# Patient Record
Sex: FEMALE | Race: WHITE | Employment: UNEMPLOYED | URBAN - METROPOLITAN AREA
[De-identification: names, ages, dates, MRNs, and addresses within clinical notes are randomized per-mention and may not be internally consistent; named-entity substitution may affect disease eponyms.]

---

## 2018-04-26 ENCOUNTER — HOSPITAL ENCOUNTER (EMERGENCY)
Age: 83
Discharge: HOME | End: 2018-04-26
Attending: EMERGENCY MEDICINE
Payer: MEDICARE

## 2018-04-26 ENCOUNTER — APPOINTMENT (OUTPATIENT)
Dept: CT IMAGING | Age: 83
End: 2018-04-26
Attending: EMERGENCY MEDICINE
Payer: MEDICARE

## 2018-04-26 ENCOUNTER — APPOINTMENT (OUTPATIENT)
Dept: GENERAL RADIOLOGY | Age: 83
End: 2018-04-26
Attending: EMERGENCY MEDICINE
Payer: MEDICARE

## 2018-04-26 VITALS
HEART RATE: 75 BPM | WEIGHT: 220 LBS | HEIGHT: 66 IN | SYSTOLIC BLOOD PRESSURE: 182 MMHG | DIASTOLIC BLOOD PRESSURE: 84 MMHG | TEMPERATURE: 98.1 F | OXYGEN SATURATION: 99 % | RESPIRATION RATE: 13 BRPM | BODY MASS INDEX: 35.36 KG/M2

## 2018-04-26 DIAGNOSIS — N30.01 ACUTE CYSTITIS WITH HEMATURIA: Primary | ICD-10-CM

## 2018-04-26 DIAGNOSIS — R11.2 NAUSEA AND VOMITING IN ADULT: ICD-10-CM

## 2018-04-26 LAB
ALBUMIN SERPL-MCNC: 3 G/DL (ref 3.4–5)
ALBUMIN/GLOB SERPL: 0.8 {RATIO} (ref 0.8–1.7)
ALP SERPL-CCNC: 91 U/L (ref 45–117)
ALT SERPL-CCNC: 13 U/L (ref 13–56)
ANION GAP SERPL CALC-SCNC: 8 MMOL/L (ref 3–18)
APPEARANCE UR: ABNORMAL
APTT PPP: 28.1 SEC (ref 23–36.4)
AST SERPL-CCNC: 12 U/L (ref 15–37)
ATRIAL RATE: 73 BPM
BACTERIA URNS QL MICRO: ABNORMAL /HPF
BASOPHILS # BLD: 0 K/UL (ref 0–0.06)
BASOPHILS NFR BLD: 0 % (ref 0–2)
BILIRUB DIRECT SERPL-MCNC: <0.1 MG/DL (ref 0–0.2)
BILIRUB SERPL-MCNC: 0.3 MG/DL (ref 0.2–1)
BILIRUB UR QL: NEGATIVE
BNP SERPL-MCNC: 700 PG/ML (ref 0–1800)
BUN SERPL-MCNC: 12 MG/DL (ref 7–18)
BUN/CREAT SERPL: 13 (ref 12–20)
CALCIUM SERPL-MCNC: 8.7 MG/DL (ref 8.5–10.1)
CALCULATED P AXIS, ECG09: 48 DEGREES
CALCULATED R AXIS, ECG10: -9 DEGREES
CALCULATED T AXIS, ECG11: 20 DEGREES
CHLORIDE SERPL-SCNC: 103 MMOL/L (ref 100–108)
CO2 SERPL-SCNC: 28 MMOL/L (ref 21–32)
COLOR UR: YELLOW
CREAT SERPL-MCNC: 0.96 MG/DL (ref 0.6–1.3)
DIAGNOSIS, 93000: NORMAL
DIFFERENTIAL METHOD BLD: ABNORMAL
EOSINOPHIL # BLD: 0.1 K/UL (ref 0–0.4)
EOSINOPHIL NFR BLD: 1 % (ref 0–5)
EPITH CASTS URNS QL MICRO: NEGATIVE /LPF (ref 0–5)
ERYTHROCYTE [DISTWIDTH] IN BLOOD BY AUTOMATED COUNT: 14.4 % (ref 11.6–14.5)
GLOBULIN SER CALC-MCNC: 4 G/DL (ref 2–4)
GLUCOSE SERPL-MCNC: 216 MG/DL (ref 74–99)
GLUCOSE UR STRIP.AUTO-MCNC: 100 MG/DL
HCT VFR BLD AUTO: 43.3 % (ref 35–45)
HGB BLD-MCNC: 14.2 G/DL (ref 12–16)
HGB UR QL STRIP: ABNORMAL
INR PPP: 1 (ref 0.8–1.2)
KETONES UR QL STRIP.AUTO: NEGATIVE MG/DL
LEUKOCYTE ESTERASE UR QL STRIP.AUTO: ABNORMAL
LIPASE SERPL-CCNC: 113 U/L (ref 73–393)
LYMPHOCYTES # BLD: 2.2 K/UL (ref 0.9–3.6)
LYMPHOCYTES NFR BLD: 20 % (ref 21–52)
MCH RBC QN AUTO: 29.5 PG (ref 24–34)
MCHC RBC AUTO-ENTMCNC: 32.8 G/DL (ref 31–37)
MCV RBC AUTO: 90 FL (ref 74–97)
MONOCYTES # BLD: 0.6 K/UL (ref 0.05–1.2)
MONOCYTES NFR BLD: 5 % (ref 3–10)
NEUTS SEG # BLD: 8.1 K/UL (ref 1.8–8)
NEUTS SEG NFR BLD: 74 % (ref 40–73)
NITRITE UR QL STRIP.AUTO: NEGATIVE
P-R INTERVAL, ECG05: 190 MS
PH UR STRIP: 7 [PH] (ref 5–8)
PHOSPHATE SERPL-MCNC: 2.4 MG/DL (ref 2.5–4.9)
PLATELET # BLD AUTO: 196 K/UL (ref 135–420)
PMV BLD AUTO: 13.1 FL (ref 9.2–11.8)
POTASSIUM SERPL-SCNC: 3.4 MMOL/L (ref 3.5–5.5)
PROT SERPL-MCNC: 7 G/DL (ref 6.4–8.2)
PROT UR STRIP-MCNC: ABNORMAL MG/DL
PROTHROMBIN TIME: 12.6 SEC (ref 11.5–15.2)
Q-T INTERVAL, ECG07: 406 MS
QRS DURATION, ECG06: 98 MS
QTC CALCULATION (BEZET), ECG08: 447 MS
RBC # BLD AUTO: 4.81 M/UL (ref 4.2–5.3)
RBC #/AREA URNS HPF: ABNORMAL /HPF (ref 0–5)
SODIUM SERPL-SCNC: 139 MMOL/L (ref 136–145)
SP GR UR REFRACTOMETRY: 1.02 (ref 1–1.03)
TROPONIN I SERPL-MCNC: <0.02 NG/ML (ref 0–0.04)
UROBILINOGEN UR QL STRIP.AUTO: 0.2 EU/DL (ref 0.2–1)
VENTRICULAR RATE, ECG03: 73 BPM
WBC # BLD AUTO: 11 K/UL (ref 4.6–13.2)
WBC URNS QL MICRO: ABNORMAL /HPF (ref 0–4)

## 2018-04-26 PROCEDURE — 85730 THROMBOPLASTIN TIME PARTIAL: CPT | Performed by: EMERGENCY MEDICINE

## 2018-04-26 PROCEDURE — 84100 ASSAY OF PHOSPHORUS: CPT | Performed by: EMERGENCY MEDICINE

## 2018-04-26 PROCEDURE — 81001 URINALYSIS AUTO W/SCOPE: CPT | Performed by: EMERGENCY MEDICINE

## 2018-04-26 PROCEDURE — 83880 ASSAY OF NATRIURETIC PEPTIDE: CPT | Performed by: EMERGENCY MEDICINE

## 2018-04-26 PROCEDURE — 93005 ELECTROCARDIOGRAM TRACING: CPT

## 2018-04-26 PROCEDURE — 85610 PROTHROMBIN TIME: CPT | Performed by: EMERGENCY MEDICINE

## 2018-04-26 PROCEDURE — 96374 THER/PROPH/DIAG INJ IV PUSH: CPT

## 2018-04-26 PROCEDURE — 74011250636 HC RX REV CODE- 250/636: Performed by: EMERGENCY MEDICINE

## 2018-04-26 PROCEDURE — 85025 COMPLETE CBC W/AUTO DIFF WBC: CPT | Performed by: EMERGENCY MEDICINE

## 2018-04-26 PROCEDURE — 74177 CT ABD & PELVIS W/CONTRAST: CPT

## 2018-04-26 PROCEDURE — 80076 HEPATIC FUNCTION PANEL: CPT | Performed by: EMERGENCY MEDICINE

## 2018-04-26 PROCEDURE — 96375 TX/PRO/DX INJ NEW DRUG ADDON: CPT

## 2018-04-26 PROCEDURE — 84484 ASSAY OF TROPONIN QUANT: CPT | Performed by: EMERGENCY MEDICINE

## 2018-04-26 PROCEDURE — 96361 HYDRATE IV INFUSION ADD-ON: CPT

## 2018-04-26 PROCEDURE — 71045 X-RAY EXAM CHEST 1 VIEW: CPT

## 2018-04-26 PROCEDURE — 99285 EMERGENCY DEPT VISIT HI MDM: CPT

## 2018-04-26 PROCEDURE — 80048 BASIC METABOLIC PNL TOTAL CA: CPT | Performed by: EMERGENCY MEDICINE

## 2018-04-26 PROCEDURE — 83690 ASSAY OF LIPASE: CPT | Performed by: EMERGENCY MEDICINE

## 2018-04-26 PROCEDURE — 74011636320 HC RX REV CODE- 636/320: Performed by: EMERGENCY MEDICINE

## 2018-04-26 RX ORDER — CEFTRIAXONE 1 G/1
1 INJECTION, POWDER, FOR SOLUTION INTRAMUSCULAR; INTRAVENOUS
Status: COMPLETED | OUTPATIENT
Start: 2018-04-26 | End: 2018-04-26

## 2018-04-26 RX ORDER — FOLIC ACID 1 MG/1
1 TABLET ORAL DAILY
COMMUNITY
End: 2019-10-16

## 2018-04-26 RX ORDER — ONDANSETRON 2 MG/ML
4 INJECTION INTRAMUSCULAR; INTRAVENOUS
Status: COMPLETED | OUTPATIENT
Start: 2018-04-26 | End: 2018-04-26

## 2018-04-26 RX ORDER — CEPHALEXIN 500 MG/1
500 CAPSULE ORAL 4 TIMES DAILY
Qty: 28 CAP | Refills: 0 | Status: SHIPPED | OUTPATIENT
Start: 2018-04-26 | End: 2018-05-03

## 2018-04-26 RX ORDER — METOCLOPRAMIDE HYDROCHLORIDE 5 MG/ML
10 INJECTION INTRAMUSCULAR; INTRAVENOUS
Status: COMPLETED | OUTPATIENT
Start: 2018-04-26 | End: 2018-04-26

## 2018-04-26 RX ORDER — ONDANSETRON 4 MG/1
TABLET, ORALLY DISINTEGRATING ORAL
Qty: 10 TAB | Refills: 0 | Status: SHIPPED | OUTPATIENT
Start: 2018-04-26 | End: 2021-10-18

## 2018-04-26 RX ADMIN — ONDANSETRON 4 MG: 2 INJECTION INTRAMUSCULAR; INTRAVENOUS at 08:51

## 2018-04-26 RX ADMIN — SODIUM CHLORIDE 500 ML: 900 INJECTION, SOLUTION INTRAVENOUS at 08:51

## 2018-04-26 RX ADMIN — METOCLOPRAMIDE 10 MG: 5 INJECTION, SOLUTION INTRAMUSCULAR; INTRAVENOUS at 11:09

## 2018-04-26 RX ADMIN — IOPAMIDOL 90 ML: 612 INJECTION, SOLUTION INTRAVENOUS at 10:15

## 2018-04-26 RX ADMIN — CEFTRIAXONE 1 G: 1 INJECTION, POWDER, FOR SOLUTION INTRAMUSCULAR; INTRAVENOUS at 12:40

## 2018-04-26 NOTE — ED PROVIDER NOTES
EMERGENCY DEPARTMENT HISTORY AND PHYSICAL EXAM    8:38 AM      Date: 4/26/2018  Patient Name: Gabbie Heath    History of Presenting Illness     Chief Complaint   Patient presents with    Nausea    Vomiting         History Provided By: Patient    Chief Complaint: Nausea, Vomiting  Duration:  Days  Timing:  Acute  Location: N/A  Quality: N/A  Severity: Severe  Modifying Factors: Nothing improves or exacerbates  Associated Symptoms: Generalized weakness      Additional History (Context): Charlene Pollard is a 80 y.o. female with DM and HTN who presents via EMS with c/o nausea and vomiting accompanied by severe generalized weakness with acute onset yesterday. She notes that she does not feel any pain, but feels \"sick to my stomach, I feel faint. \" She denies abdominal and chest pain. Patient also denies diarrhea. History is limited, patient is poor historian, does not provide any more detail. PCP: Wil Rangel, DO    Current Outpatient Prescriptions   Medication Sig Dispense Refill    insulin lispro protamine/insulin lispro (HUMALOG MIX 75-25,U-100,INSULN) 100 unit/mL (75-25) injection 26 Units by SubCUTAneous route two (2) times a day.  folic acid (FOLVITE) 1 mg tablet Take 1 mg by mouth daily.  ondansetron (ZOFRAN ODT) 4 mg disintegrating tablet Take 1 tablets every 6-8 hours as needed for nausea and vomiting. 10 Tab 0    cephALEXin (KEFLEX) 500 mg capsule Take 1 Cap by mouth four (4) times daily for 7 days. 28 Cap 0    amLODIPine (NORVASC) 5 mg tablet Take 1 Tab by mouth daily. Indications: HYPERTENSION 30 Tab 0    lisinopril (PRINIVIL, ZESTRIL) 10 mg tablet Take 1 Tab by mouth daily. 30 Tab 0    CYANOCOBALAMIN, VITAMIN B-12, (VITAMIN B-12 PO) Take  by mouth.  aspirin 81 mg chewable tablet Take 81 mg by mouth daily.  CHOLECALCIFEROL, VITAMIN D3, (VITAMIN D3 PO) Take  by mouth.          Past History     Past Medical History:  Past Medical History:   Diagnosis Date    Diabetes ([facility] [address]     Hypertension        Past Surgical History:  Past Surgical History:   Procedure Laterality Date    HX HYSTERECTOMY  age 62       Family History:  Family History   Problem Relation Age of Onset    Breast Cancer Sister     Ovarian Cancer Sister        Social History:  Social History   Substance Use Topics    Smoking status: None    Smokeless tobacco: None    Alcohol use None       Allergies: Allergies   Allergen Reactions    Actoplus Met [Pioglitazone-Metformin] Not Reported This Time    Celestone [Betamethasone] Not Reported This Time    Clarithromycin Not Reported This Time    Codeine Other (comments)    Flagyl [Metronidazole] Not Reported This Time    Levaquin [Levofloxacin] Itching    Metformin Not Reported This Time    Other Medication Not Reported This Time     genifor  Mercaine    Prednisone Not Reported This Time    Prevacid [Lansoprazole] Not Reported This Time    Sulfa (Sulfonamide Antibiotics) Not Reported This Time         Review of Systems       Review of Systems   Constitutional: Negative for chills and fever. HENT: Negative for ear pain and sore throat. Eyes: Negative for pain and visual disturbance. Respiratory: Negative for cough and shortness of breath. Cardiovascular: Negative for chest pain and palpitations. Gastrointestinal: Positive for nausea and vomiting. Negative for abdominal pain and diarrhea. Genitourinary: Negative for flank pain. Musculoskeletal: Negative for back pain and neck pain. Neurological: Positive for weakness (Generalized). Negative for syncope and headaches. Psychiatric/Behavioral: Negative for agitation. The patient is not nervous/anxious. Physical Exam     Visit Vitals    /84    Pulse 75    Temp 98.1 °F (36.7 °C)    Resp 13    Ht 5' 6\" (1.676 m)    Wt 99.8 kg (220 lb)    SpO2 99%    BMI 35.51 kg/m2         Physical Exam   Constitutional: She is oriented to person, place, and time.  She appears well-developed and well-nourished. She appears distressed (Mild, secondary to nausea). Overweight   HENT:   Head: Normocephalic and atraumatic. Mouth/Throat: Oropharynx is clear and moist.   Eyes: Pupils are equal, round, and reactive to light. No scleral icterus. Neck: Neck supple. No tracheal deviation present. Cardiovascular: Regular rhythm. No murmur heard. Pulmonary/Chest: Effort normal and breath sounds normal. No respiratory distress. She has no wheezes. She has no rales. Clear lungs   Abdominal: Soft. There is no tenderness. There is no guarding. Musculoskeletal: Normal range of motion. She exhibits no deformity. Neurological: She is alert and oriented to person, place, and time. No gross neuro deficit   Skin: Skin is warm and dry. No rash noted. She is not diaphoretic. Psychiatric: She has a normal mood and affect. Nursing note and vitals reviewed. Diagnostic Study Results     Labs -  Labs Reviewed   CBC WITH AUTOMATED DIFF - Abnormal; Notable for the following:        Result Value    MPV 13.1 (*)     NEUTROPHILS 74 (*)     LYMPHOCYTES 20 (*)     ABS. NEUTROPHILS 8.1 (*)     All other components within normal limits   METABOLIC PANEL, BASIC - Abnormal; Notable for the following:     Potassium 3.4 (*)     Glucose 216 (*)     GFR est non-AA 54 (*)     All other components within normal limits   HEPATIC FUNCTION PANEL - Abnormal; Notable for the following:      Albumin 3.0 (*)     AST (SGOT) 12 (*)     All other components within normal limits   PHOSPHORUS - Abnormal; Notable for the following:     Phosphorus 2.4 (*)     All other components within normal limits   URINALYSIS W/ RFLX MICROSCOPIC - Abnormal; Notable for the following:     Protein TRACE (*)     Glucose 100 (*)     Blood TRACE (*)     Leukocyte Esterase LARGE (*)     All other components within normal limits   URINE MICROSCOPIC ONLY - Abnormal; Notable for the following:     Bacteria 4+ (*)     All other components within normal limits   PROTHROMBIN TIME + INR   PTT   LIPASE   NT-PRO BNP   TROPONIN I       Radiologic Studies -   CT ABD PELV W CONT   Final Result      XR CHEST PORT   Final Result        CT ABD PELV W CONT  IMPRESSION:     1. No acute intra-abdominal or pelvic abnormality. No bowel obstruction. 2. Small hiatal hernia. 3. Scattered colonic diverticula without evidence of diverticulitis. Normal  Appendix. XR CHEST PORT  Impression:  Rotated projection of the chest without superimposed acute radiographic  abnormality. Medical Decision Making   I am the first provider for this patient. I reviewed the vital signs, available nursing notes, past medical history, past surgical history, family history and social history. Vital Signs-Reviewed the patient's vital signs. Pulse Oximetry Analysis -  98% on room air (Interpretation) Normal    EKG: Interpreted by the EP. Time 8:40am  SR  73 bpm  Normal axis  Interval WNL  No acute ST elevations or depressions  Isolated T wave inversion in 3, nonspecific  Grossly unchanged from prior    Records Reviewed: Nursing Notes (Time of Review: 8:38 AM)    ED Course: Progress Notes, Reevaluation, and Consults:  ED Course   Comment By Time   No significant abnormalities on work-up Johnathan Roblero, DO 04/26 1137       Provider Notes (Medical Decision Making):     DDX:  occult infection, occult ACS, SBO, gastritis, dyspepsia, early gastroenteritis, electrolyte abnormality    80 y.o. female with noted past medical history who presented with general weakness and n/v onset last night. Vitals were notable for HTN  The differential above was considered. The patient was given anti-emetics. Diagnostics notable for UTI. Negative troponin, no ischemic changes on ecg. Given rocephin in ED and RX Keflex for home. Patient has no new complaints, changes, or physical findings. Diagnostic studies were reviewed with the patient.   Pt and/or family's questions and concerns were addressed. Care plan was outlined, including follow-up with PCP/specialist and return precautions were discussed. Patient is felt to be stable for discharge at this time. Diagnosis     Clinical Impression:   1. Acute cystitis with hematuria    2. Nausea and vomiting in adult        Disposition: Discharge    Follow-up Information     Follow up With Details Comments Contact Info    [facility] EMERGENCY DEPT  If symptoms worsen [address] [facility],  Schedule an appointment as soon as possible for a visit in 1 week  [address] [facility]  [phone]             Patient's Medications   Start Taking    CEPHALEXIN (KEFLEX) 500 MG CAPSULE    Take 1 Cap by mouth four (4) times daily for 7 days. ONDANSETRON (ZOFRAN ODT) 4 MG DISINTEGRATING TABLET    Take 1 tablets every 6-8 hours as needed for nausea and vomiting. Continue Taking    AMLODIPINE (NORVASC) 5 MG TABLET    Take 1 Tab by mouth daily. Indications: HYPERTENSION    ASPIRIN 81 MG CHEWABLE TABLET    Take 81 mg by mouth daily. CHOLECALCIFEROL, VITAMIN D3, (VITAMIN D3 PO)    Take  by mouth. CYANOCOBALAMIN, VITAMIN B-12, (VITAMIN B-12 PO)    Take  by mouth. FOLIC ACID (FOLVITE) 1 MG TABLET    Take 1 mg by mouth daily. INSULIN LISPRO PROTAMINE/INSULIN LISPRO (HUMALOG MIX 75-25,U-100,INSULN) 100 UNIT/ML (75-25) INJECTION    26 Units by SubCUTAneous route two (2) times a day. LISINOPRIL (PRINIVIL, ZESTRIL) 10 MG TABLET    Take 1 Tab by mouth daily. These Medications have changed    No medications on file   Stop Taking    INSULIN (NOVOLIN 70/30) 100 UNIT/ML (70-30) INJECTION    15 Units by SubCUTAneous route two (2) times a day.     ONDANSETRON (ZOFRAN ODT) 4 MG DISINTEGRATING TABLET    Take 1 Tab by mouth every eight (8) hours as needed for Nausea.     _______________________________  Patty 176Patsy acting as a scribe for and in the presence of Loly Sharp DO      April 26, 2018 at 8:38 AM       Provider Attestation:      I personally performed the services described in the documentation, reviewed the documentation, as recorded by the scribe in my presence, and it accurately and completely records my words and actions.  April 26, 2018 at 8:38 AM - Loly Sharp DO

## 2018-04-26 NOTE — ED NOTES
I have reviewed discharge instructions with the patient. The patient verbalized understanding. Patient armband removed and given to patient to take home. Patient was informed of the privacy risks if armband lost or stolen. Pt wheeled in wheelchair to car with no difficulty, son driving patient home.

## 2019-04-21 ENCOUNTER — APPOINTMENT (OUTPATIENT)
Dept: CT IMAGING | Age: 84
End: 2019-04-21
Attending: EMERGENCY MEDICINE
Payer: MEDICARE

## 2019-04-21 ENCOUNTER — HOSPITAL ENCOUNTER (EMERGENCY)
Age: 84
Discharge: HOME | End: 2019-04-21
Attending: EMERGENCY MEDICINE
Payer: MEDICARE

## 2019-04-21 VITALS
OXYGEN SATURATION: 99 % | HEART RATE: 77 BPM | TEMPERATURE: 98.2 F | DIASTOLIC BLOOD PRESSURE: 53 MMHG | RESPIRATION RATE: 13 BRPM | SYSTOLIC BLOOD PRESSURE: 134 MMHG

## 2019-04-21 DIAGNOSIS — M54.5 ACUTE RIGHT-SIDED LOW BACK PAIN, WITH SCIATICA PRESENCE UNSPECIFIED: Primary | ICD-10-CM

## 2019-04-21 LAB
ANION GAP SERPL CALC-SCNC: 10 MMOL/L (ref 3–18)
APPEARANCE UR: CLEAR
BACTERIA URNS QL MICRO: ABNORMAL /HPF
BASOPHILS # BLD: 0 K/UL (ref 0–0.1)
BASOPHILS NFR BLD: 0 % (ref 0–2)
BILIRUB UR QL: NEGATIVE
BUN SERPL-MCNC: 22 MG/DL (ref 7–18)
BUN/CREAT SERPL: 18 (ref 12–20)
CALCIUM SERPL-MCNC: 8.5 MG/DL (ref 8.5–10.1)
CHLORIDE SERPL-SCNC: 97 MMOL/L (ref 100–108)
CO2 SERPL-SCNC: 30 MMOL/L (ref 21–32)
COLOR UR: YELLOW
CREAT SERPL-MCNC: 1.24 MG/DL (ref 0.6–1.3)
DIFFERENTIAL METHOD BLD: ABNORMAL
EOSINOPHIL # BLD: 0.2 K/UL (ref 0–0.4)
EOSINOPHIL NFR BLD: 2 % (ref 0–5)
EPITH CASTS URNS QL MICRO: ABNORMAL /LPF (ref 0–5)
ERYTHROCYTE [DISTWIDTH] IN BLOOD BY AUTOMATED COUNT: 13.8 % (ref 11.6–14.5)
GLUCOSE SERPL-MCNC: 300 MG/DL (ref 74–99)
GLUCOSE UR STRIP.AUTO-MCNC: 250 MG/DL
HCT VFR BLD AUTO: 38 % (ref 35–45)
HGB BLD-MCNC: 12.4 G/DL (ref 12–16)
HGB UR QL STRIP: NEGATIVE
KETONES UR QL STRIP.AUTO: NEGATIVE MG/DL
LEUKOCYTE ESTERASE UR QL STRIP.AUTO: ABNORMAL
LYMPHOCYTES # BLD: 2.5 K/UL (ref 0.9–3.6)
LYMPHOCYTES NFR BLD: 21 % (ref 21–52)
MCH RBC QN AUTO: 29.7 PG (ref 24–34)
MCHC RBC AUTO-ENTMCNC: 32.6 G/DL (ref 31–37)
MCV RBC AUTO: 90.9 FL (ref 74–97)
MONOCYTES # BLD: 0.7 K/UL (ref 0.05–1.2)
MONOCYTES NFR BLD: 6 % (ref 3–10)
NEUTS SEG # BLD: 8.3 K/UL (ref 1.8–8)
NEUTS SEG NFR BLD: 71 % (ref 40–73)
NITRITE UR QL STRIP.AUTO: NEGATIVE
PH UR STRIP: 6.5 [PH] (ref 5–8)
PLATELET # BLD AUTO: 192 K/UL (ref 135–420)
PMV BLD AUTO: 13.7 FL (ref 9.2–11.8)
POTASSIUM SERPL-SCNC: 3.5 MMOL/L (ref 3.5–5.5)
PROT UR STRIP-MCNC: NEGATIVE MG/DL
RBC # BLD AUTO: 4.18 M/UL (ref 4.2–5.3)
RBC #/AREA URNS HPF: ABNORMAL /HPF (ref 0–5)
SODIUM SERPL-SCNC: 137 MMOL/L (ref 136–145)
SP GR UR REFRACTOMETRY: 1.01 (ref 1–1.03)
UROBILINOGEN UR QL STRIP.AUTO: 0.2 EU/DL (ref 0.2–1)
WBC # BLD AUTO: 11.7 K/UL (ref 4.6–13.2)
WBC URNS QL MICRO: ABNORMAL /HPF (ref 0–4)

## 2019-04-21 PROCEDURE — 74176 CT ABD & PELVIS W/O CONTRAST: CPT

## 2019-04-21 PROCEDURE — 81001 URINALYSIS AUTO W/SCOPE: CPT

## 2019-04-21 PROCEDURE — 74011250637 HC RX REV CODE- 250/637: Performed by: EMERGENCY MEDICINE

## 2019-04-21 PROCEDURE — 80048 BASIC METABOLIC PNL TOTAL CA: CPT

## 2019-04-21 PROCEDURE — 85025 COMPLETE CBC W/AUTO DIFF WBC: CPT

## 2019-04-21 PROCEDURE — 99284 EMERGENCY DEPT VISIT MOD MDM: CPT

## 2019-04-21 RX ORDER — NITROFURANTOIN 25; 75 MG/1; MG/1
100 CAPSULE ORAL 2 TIMES DAILY
Qty: 6 CAP | Refills: 0 | Status: SHIPPED | OUTPATIENT
Start: 2019-04-21 | End: 2019-04-24

## 2019-04-21 RX ORDER — NITROFURANTOIN 25; 75 MG/1; MG/1
100 CAPSULE ORAL 2 TIMES DAILY
Status: DISCONTINUED | OUTPATIENT
Start: 2019-04-21 | End: 2019-04-21 | Stop reason: HOSPADM

## 2019-04-21 RX ADMIN — NITROFURANTOIN (MONOHYDRATE/MACROCRYSTALS) 100 MG: 75; 25 CAPSULE ORAL at 12:00

## 2019-04-21 NOTE — ED PROVIDER NOTES
EMERGENCY DEPARTMENT HISTORY AND PHYSICAL EXAM 
 
10:21 AM 
 
 
Date: 4/21/2019 Patient Name: Flory Munoz History of Presenting Illness Chief Complaint Patient presents with  Back Pain History Provided By: son Additional History (Context): Charlene Lama is a 80 y.o. female presents with 3 days of right-sided back and flank pain wrapping around to the front. Her son says when she walks and uses a walker she usually gets around well but the last few days has been complaining of the pain consistently. No other signs, dysuria hematuria vomiting diarrhea and no known falls. The pain is severe and exacerbated by movement. No medications tried. Her blood sugar this morning was around 350, she is insulin-dependent diabetic. Laura Vallejo PCP: Nu Beavers DO Chief Complaint:  
Duration:   
Timing: Location:  
Quality:  
Severity:  
Modifying Factors:  
Associated Symptoms:  
 
 
Current Facility-Administered Medications Medication Dose Route Frequency Provider Last Rate Last Dose  nitrofurantoin (macrocrystal-monohydrate) (MACROBID) capsule 100 mg  100 mg Oral BID Sara Mancia MD      
 
Current Outpatient Medications Medication Sig Dispense Refill  insulin lispro protamine/insulin lispro (HUMALOG MIX 75-25,U-100,INSULN) 100 unit/mL (75-25) injection 26 Units by SubCUTAneous route two (2) times a day.  folic acid (FOLVITE) 1 mg tablet Take 1 mg by mouth daily.  ondansetron (ZOFRAN ODT) 4 mg disintegrating tablet Take 1 tablets every 6-8 hours as needed for nausea and vomiting. 10 Tab 0  
 amLODIPine (NORVASC) 5 mg tablet Take 1 Tab by mouth daily. Indications: HYPERTENSION 30 Tab 0  
 lisinopril (PRINIVIL, ZESTRIL) 10 mg tablet Take 1 Tab by mouth daily. 30 Tab 0  
 CYANOCOBALAMIN, VITAMIN B-12, (VITAMIN B-12 PO) Take  by mouth.  aspirin 81 mg chewable tablet Take 81 mg by mouth daily.  CHOLECALCIFEROL, VITAMIN D3, (VITAMIN D3 PO) Take  by mouth. Past History Past Medical History: 
Past Medical History:  
Diagnosis Date  Diabetes ([address]  Hypertension Past Surgical History: 
Past Surgical History:  
Procedure Laterality Date  HX HYSTERECTOMY  age 62 Family History: 
Family History Problem Relation Age of Onset  Breast Cancer Sister  Ovarian Cancer Sister Social History: 
Social History Tobacco Use  Smoking status: Not on file Substance Use Topics  Alcohol use: Not on file  Drug use: Not on file Allergies: Allergies Allergen Reactions  Actoplus Met [Pioglitazone-Metformin] Not Reported This Time  Celestone [Betamethasone] Not Reported This Time  Clarithromycin Not Reported This Time  Codeine Other (comments)  Flagyl [Metronidazole] Not Reported This Time  Levaquin [Levofloxacin] Itching  Metformin Not Reported This Time  Other Medication Not Reported This Time  
  genifor Alfrieda Hamming  Prednisone Not Reported This Time  Prevacid [Lansoprazole] Not Reported This Time  Sulfa (Sulfonamide Antibiotics) Not Reported This Time Review of Systems Review of Systems Constitutional: Negative for diaphoresis and fever. HENT: Negative for congestion and sore throat. Eyes: Negative for pain and itching. Respiratory: Negative for cough and shortness of breath. Cardiovascular: Negative for chest pain and palpitations. Gastrointestinal: Negative for abdominal pain and diarrhea. Endocrine: Negative for polydipsia and polyuria. Genitourinary: Negative for dysuria and hematuria. Musculoskeletal: Positive for back pain. Negative for arthralgias and myalgias. Skin: Negative for rash and wound. Neurological: Negative for seizures and syncope. Hematological: Does not bruise/bleed easily. Psychiatric/Behavioral: Negative for agitation and hallucinations.   
 
 
 
Physical Exam  
 
 
 Patient Vitals for the past 12 hrs: 
 Temp Pulse Resp BP SpO2  
04/21/19 1020 98.2 °F (36.8 °C) 77 13 (!) 146/97 100 % Physical Exam  
Constitutional: She appears well-developed and well-nourished. She appears distressed. HENT:  
Head: Normocephalic and atraumatic. Eyes: Conjunctivae are normal. No scleral icterus. Neck: Normal range of motion. Neck supple. No JVD present. Cardiovascular: Normal rate, regular rhythm and normal heart sounds. 4 intact extremity pulses Pulmonary/Chest: Effort normal and breath sounds normal.  
Abdominal: Soft. She exhibits no mass. There is no tenderness. Musculoskeletal: Normal range of motion. She has right paraspinal muscular tenderness. No CVA tenderness or tenderness with percussion. Lymphadenopathy:  
  She has no cervical adenopathy. Neurological: She is alert. Skin: Skin is warm and dry. Nursing note and vitals reviewed. Diagnostic Study Results Labs - Recent Results (from the past 12 hour(s)) URINALYSIS W/ RFLX MICROSCOPIC Collection Time: 04/21/19 10:30 AM  
Result Value Ref Range Color YELLOW Appearance CLEAR Specific gravity 1.008 1.005 - 1.030    
 pH (UA) 6.5 5.0 - 8.0 Protein NEGATIVE  NEG mg/dL Glucose 250 (A) NEG mg/dL Ketone NEGATIVE  NEG mg/dL Bilirubin NEGATIVE  NEG Blood NEGATIVE  NEG Urobilinogen 0.2 0.2 - 1.0 EU/dL Nitrites NEGATIVE  NEG Leukocyte Esterase MODERATE (A) NEG URINE MICROSCOPIC ONLY Collection Time: 04/21/19 10:30 AM  
Result Value Ref Range WBC 36 to 50 0 - 4 /hpf  
 RBC 0 to 3 0 - 5 /hpf Epithelial cells FEW 0 - 5 /lpf Bacteria 2+ (A) NEG /hpf  
CBC WITH AUTOMATED DIFF Collection Time: 04/21/19 10:37 AM  
Result Value Ref Range WBC 11.7 4.6 - 13.2 K/uL  
 RBC 4.18 (L) 4.20 - 5.30 M/uL  
 HGB 12.4 12.0 - 16.0 g/dL HCT 38.0 35.0 - 45.0 % MCV 90.9 74.0 - 97.0 FL  
 MCH 29.7 24.0 - 34.0 PG  
 MCHC 32.6 31.0 - 37.0 g/dL RDW 13.8 11.6 - 14.5 % PLATELET 468 960 - 666 K/uL MPV 13.7 (H) 9.2 - 11.8 FL  
 NEUTROPHILS 71 40 - 73 % LYMPHOCYTES 21 21 - 52 % MONOCYTES 6 3 - 10 % EOSINOPHILS 2 0 - 5 % BASOPHILS 0 0 - 2 %  
 ABS. NEUTROPHILS 8.3 (H) 1.8 - 8.0 K/UL  
 ABS. LYMPHOCYTES 2.5 0.9 - 3.6 K/UL  
 ABS. MONOCYTES 0.7 0.05 - 1.2 K/UL  
 ABS. EOSINOPHILS 0.2 0.0 - 0.4 K/UL  
 ABS. BASOPHILS 0.0 0.0 - 0.1 K/UL  
 DF AUTOMATED METABOLIC PANEL, BASIC Collection Time: 04/21/19 10:37 AM  
Result Value Ref Range Sodium 137 136 - 145 mmol/L Potassium 3.5 3.5 - 5.5 mmol/L Chloride 97 (L) 100 - 108 mmol/L  
 CO2 30 21 - 32 mmol/L Anion gap 10 3.0 - 18 mmol/L Glucose 300 (H) 74 - 99 mg/dL BUN 22 (H) 7.0 - 18 MG/DL Creatinine 1.24 0.6 - 1.3 MG/DL  
 BUN/Creatinine ratio 18 12 - 20 GFR est AA 49 (L) >60 ml/min/1.73m2 GFR est non-AA 40 (L) >60 ml/min/1.73m2 Calcium 8.5 8.5 - 10.1 MG/DL Radiologic Studies -  
CT ABD PELV WO CONT Final Result IMPRESSION:  
  
  
1. No acute findings to explain flank pain. No nephrolithiasis. 2. Uncomplicated colonic diverticulosis. 3. Status post prior lumbar fusion with intact hardware. 4. Stable hepatic cysts Ct Abd Pelv Wo Cont Result Date: 4/21/2019 EXAM: CT of the abdomen and pelvis INDICATION: Flank pain COMPARISON: CT abdomen and pelvis 4/26/2018 TECHNIQUE: Axial CT imaging of the abdomen and pelvis was performed without intravenous contrast. Multiplanar reformats were generated. One or more dose reduction techniques were used on this CT: automated exposure control, adjustment of the mAs and/or kVp according to patient size, and iterative reconstruction techniques. The specific techniques used on this CT exam have been documented in the patient's electronic medical record.   Digital Imaging and Communications in Medicine (DICOM) format image data are available to nonaffiliated external healthcare facilities or entities on a secure, media free, reciprocally searchable basis with patient authorization for at least a 12-month period after this study. . _______________ FINDINGS: LOWER CHEST: Stable bibasilar scarring. LIVER, BILIARY: There are stable hypodensities in the liver consistent with cysts. No biliary dilation. Gallbladder is unremarkable. PANCREAS: Normal. SPLEEN: Normal. ADRENALS: Normal. KIDNEYS: No evidence of nephrolithiasis, mass or hydronephrosis. Ureters are normal. LYMPH NODES: No enlarged lymph nodes. GASTROINTESTINAL TRACT: No bowel dilation or wall thickening. Scattered colonic diverticuli. No evidence of bowel obstruction. Normal appendix. PELVIC ORGANS: Prior hysterectomy. No abnormal adnexal masses. Bladder is normal. VASCULATURE: Unremarkable. BONES: No acute or aggressive osseous abnormalities identified. Prior posterior lumbar fusion L3-L5. Intact hardware. OTHER: No free fluid. _______________ IMPRESSION: 1. No acute findings to explain flank pain. No nephrolithiasis. 2. Uncomplicated colonic diverticulosis. 3. Status post prior lumbar fusion with intact hardware. 4. Stable hepatic cysts Medications ordered:  
Medications  
nitrofurantoin (macrocrystal-monohydrate) (MACROBID) capsule 100 mg (has no administration in time range) Medical Decision Making Initial Medical Decision Making and DDx: 
    Most suggestive of muscular back pain. Less likely spinal fracture, pathologic fracture, pyelonephritis, renal colic. For her elevated blood sugar most likely poorly controlled diabetes, less likely DKA. ED Course: Progress Notes, Reevaluation, and Consults: 
  
11:49 AM Pt reevaluated at this time. Discussed results and findings, as well as, diagnosis and plan for discharge. Follow up with doctors/services listed. Return to the emergency department for alarming symptoms. Pt verbalizes understanding and agreement with plan. All questions addressed. Macrobid for UTI, Tylenol for the back pain. Most consistent with musculoskeletal pain again because of its location and superficial tenderness. CT was unrevealing. Discussed with patient and her son. I am the first provider for this patient. I reviewed the vital signs, available nursing notes, past medical history, past surgical history, family history and social history. Patient Vitals for the past 12 hrs: 
 Temp Pulse Resp BP SpO2  
04/21/19 1020 98.2 °F (36.8 °C) 77 13 (!) 146/97 100 % Vital Signs-Reviewed the patient's vital signs. Pulse Oximetry Analysis, Cardiac Monitor, 12 lead ekg: 
   Sinus rhythm at 77 100% on room air Interpreted by the EP. Records Reviewed: Nursing notes reviewed (Time of Review: 10:21 AM) Procedures:  
Critical Care Time:  
Aspirin: (was aspirin given for stroke?) Diagnosis Clinical Impression: No diagnosis found. Disposition: Discharged Follow-up Information Follow up With Specialties Details Why Contact Info Brianda Rodriguez, DO Internal Medicine In 2 days  [address] [id number] 
[phone] Patient's Medications Start Taking No medications on file Continue Taking AMLODIPINE (NORVASC) 5 MG TABLET    Take 1 Tab by mouth daily. Indications: HYPERTENSION  
 ASPIRIN 81 MG CHEWABLE TABLET    Take 81 mg by mouth daily. CHOLECALCIFEROL, VITAMIN D3, (VITAMIN D3 PO)    Take  by mouth. CYANOCOBALAMIN, VITAMIN B-12, (VITAMIN B-12 PO)    Take  by mouth. FOLIC ACID (FOLVITE) 1 MG TABLET    Take 1 mg by mouth daily. INSULIN LISPRO PROTAMINE/INSULIN LISPRO (HUMALOG MIX 75-25,U-100,INSULN) 100 UNIT/ML (75-25) INJECTION    26 Units by SubCUTAneous route two (2) times a day. LISINOPRIL (PRINIVIL, ZESTRIL) 10 MG TABLET    Take 1 Tab by mouth daily.   
 ONDANSETRON (ZOFRAN ODT) 4 MG DISINTEGRATING TABLET    Take 1 tablets every 6-8 hours as needed for nausea and vomiting. These Medications have changed No medications on file Stop Taking No medications on file  
 
_______________________________ Notes:   
Therese Juarez MD using Dragon dictation     
_______________________________

## 2019-06-07 ENCOUNTER — APPOINTMENT (OUTPATIENT)
Dept: GENERAL RADIOLOGY | Age: 84
End: 2019-06-07
Attending: EMERGENCY MEDICINE
Payer: MEDICARE

## 2019-06-07 ENCOUNTER — HOSPITAL ENCOUNTER (EMERGENCY)
Age: 84
Discharge: HOME | End: 2019-06-07
Attending: EMERGENCY MEDICINE
Payer: MEDICARE

## 2019-06-07 VITALS
HEART RATE: 64 BPM | WEIGHT: 220 LBS | SYSTOLIC BLOOD PRESSURE: 127 MMHG | DIASTOLIC BLOOD PRESSURE: 50 MMHG | BODY MASS INDEX: 36.65 KG/M2 | TEMPERATURE: 97.5 F | HEIGHT: 65 IN | RESPIRATION RATE: 17 BRPM | OXYGEN SATURATION: 98 %

## 2019-06-07 DIAGNOSIS — R53.1 GENERALIZED WEAKNESS: Primary | ICD-10-CM

## 2019-06-07 DIAGNOSIS — N39.0 LOWER URINARY TRACT INFECTIOUS DISEASE: ICD-10-CM

## 2019-06-07 LAB
ALBUMIN SERPL-MCNC: 3.1 G/DL (ref 3.4–5)
ALBUMIN/GLOB SERPL: 0.8 {RATIO} (ref 0.8–1.7)
ALP SERPL-CCNC: 128 U/L (ref 45–117)
ALT SERPL-CCNC: 14 U/L (ref 13–56)
AMORPH CRY URNS QL MICRO: ABNORMAL
ANION GAP SERPL CALC-SCNC: 10 MMOL/L (ref 3–18)
APPEARANCE UR: CLEAR
AST SERPL-CCNC: 13 U/L (ref 15–37)
ATRIAL RATE: 73 BPM
BACTERIA URNS QL MICRO: NEGATIVE /HPF
BASOPHILS # BLD: 0.1 K/UL (ref 0–0.1)
BASOPHILS NFR BLD: 1 % (ref 0–2)
BILIRUB SERPL-MCNC: 0.2 MG/DL (ref 0.2–1)
BILIRUB UR QL: NEGATIVE
BUN SERPL-MCNC: 23 MG/DL (ref 7–18)
BUN/CREAT SERPL: 18 (ref 12–20)
CALCIUM SERPL-MCNC: 8.5 MG/DL (ref 8.5–10.1)
CALCULATED P AXIS, ECG09: 57 DEGREES
CALCULATED R AXIS, ECG10: -33 DEGREES
CALCULATED T AXIS, ECG11: 17 DEGREES
CHLORIDE SERPL-SCNC: 97 MMOL/L (ref 100–108)
CO2 SERPL-SCNC: 29 MMOL/L (ref 21–32)
COLOR UR: YELLOW
CREAT SERPL-MCNC: 1.28 MG/DL (ref 0.6–1.3)
DIAGNOSIS, 93000: NORMAL
DIFFERENTIAL METHOD BLD: ABNORMAL
EOSINOPHIL # BLD: 0.5 K/UL (ref 0–0.4)
EOSINOPHIL NFR BLD: 4 % (ref 0–5)
EPITH CASTS URNS QL MICRO: ABNORMAL /LPF (ref 0–5)
ERYTHROCYTE [DISTWIDTH] IN BLOOD BY AUTOMATED COUNT: 14.1 % (ref 11.6–14.5)
GLOBULIN SER CALC-MCNC: 3.9 G/DL (ref 2–4)
GLUCOSE SERPL-MCNC: 379 MG/DL (ref 74–99)
GLUCOSE UR STRIP.AUTO-MCNC: >1000 MG/DL
HCT VFR BLD AUTO: 38.8 % (ref 35–45)
HGB BLD-MCNC: 12.3 G/DL (ref 12–16)
HGB UR QL STRIP: NEGATIVE
KETONES UR QL STRIP.AUTO: NEGATIVE MG/DL
LEUKOCYTE ESTERASE UR QL STRIP.AUTO: ABNORMAL
LYMPHOCYTES # BLD: 2.3 K/UL (ref 0.9–3.6)
LYMPHOCYTES NFR BLD: 19 % (ref 21–52)
MAGNESIUM SERPL-MCNC: 1.7 MG/DL (ref 1.6–2.6)
MCH RBC QN AUTO: 29.2 PG (ref 24–34)
MCHC RBC AUTO-ENTMCNC: 31.7 G/DL (ref 31–37)
MCV RBC AUTO: 92.2 FL (ref 74–97)
MONOCYTES # BLD: 0.8 K/UL (ref 0.05–1.2)
MONOCYTES NFR BLD: 6 % (ref 3–10)
NEUTS SEG # BLD: 8.8 K/UL (ref 1.8–8)
NEUTS SEG NFR BLD: 70 % (ref 40–73)
NITRITE UR QL STRIP.AUTO: NEGATIVE
P-R INTERVAL, ECG05: 206 MS
PH UR STRIP: 6 [PH] (ref 5–8)
PLATELET # BLD AUTO: 188 K/UL (ref 135–420)
PMV BLD AUTO: 13.7 FL (ref 9.2–11.8)
POTASSIUM SERPL-SCNC: 3.6 MMOL/L (ref 3.5–5.5)
PROT SERPL-MCNC: 7 G/DL (ref 6.4–8.2)
PROT UR STRIP-MCNC: NEGATIVE MG/DL
Q-T INTERVAL, ECG07: 406 MS
QRS DURATION, ECG06: 96 MS
QTC CALCULATION (BEZET), ECG08: 447 MS
RBC # BLD AUTO: 4.21 M/UL (ref 4.2–5.3)
RBC #/AREA URNS HPF: ABNORMAL /HPF (ref 0–5)
SODIUM SERPL-SCNC: 136 MMOL/L (ref 136–145)
SP GR UR REFRACTOMETRY: 1.02 (ref 1–1.03)
TROPONIN I SERPL-MCNC: <0.02 NG/ML (ref 0–0.04)
TSH SERPL DL<=0.05 MIU/L-ACNC: 2.53 UIU/ML (ref 0.36–3.74)
UROBILINOGEN UR QL STRIP.AUTO: 0.2 EU/DL (ref 0.2–1)
VENTRICULAR RATE, ECG03: 73 BPM
WBC # BLD AUTO: 12.4 K/UL (ref 4.6–13.2)
WBC URNS QL MICRO: ABNORMAL /HPF (ref 0–4)

## 2019-06-07 PROCEDURE — 83735 ASSAY OF MAGNESIUM: CPT

## 2019-06-07 PROCEDURE — 81001 URINALYSIS AUTO W/SCOPE: CPT

## 2019-06-07 PROCEDURE — 84443 ASSAY THYROID STIM HORMONE: CPT

## 2019-06-07 PROCEDURE — 80053 COMPREHEN METABOLIC PANEL: CPT

## 2019-06-07 PROCEDURE — 71045 X-RAY EXAM CHEST 1 VIEW: CPT

## 2019-06-07 PROCEDURE — 93005 ELECTROCARDIOGRAM TRACING: CPT

## 2019-06-07 PROCEDURE — 96361 HYDRATE IV INFUSION ADD-ON: CPT

## 2019-06-07 PROCEDURE — 96365 THER/PROPH/DIAG IV INF INIT: CPT

## 2019-06-07 PROCEDURE — 74011000258 HC RX REV CODE- 258: Performed by: EMERGENCY MEDICINE

## 2019-06-07 PROCEDURE — 84484 ASSAY OF TROPONIN QUANT: CPT

## 2019-06-07 PROCEDURE — 74011250636 HC RX REV CODE- 250/636: Performed by: EMERGENCY MEDICINE

## 2019-06-07 PROCEDURE — 99285 EMERGENCY DEPT VISIT HI MDM: CPT

## 2019-06-07 PROCEDURE — 85025 COMPLETE CBC W/AUTO DIFF WBC: CPT

## 2019-06-07 RX ORDER — CEFUROXIME AXETIL 500 MG/1
500 TABLET ORAL 2 TIMES DAILY
Qty: 10 TAB | Refills: 0 | Status: SHIPPED | OUTPATIENT
Start: 2019-06-07 | End: 2019-06-12

## 2019-06-07 RX ADMIN — CEFTRIAXONE 1 G: 1 INJECTION, POWDER, FOR SOLUTION INTRAMUSCULAR; INTRAVENOUS at 17:28

## 2019-06-07 RX ADMIN — SODIUM CHLORIDE 500 ML: 900 INJECTION, SOLUTION INTRAVENOUS at 16:54

## 2019-06-07 NOTE — ED PROVIDER NOTES
EMERGENCY DEPARTMENT HISTORY AND PHYSICAL EXAM    Date: 6/7/2019  Patient Name: Gravis Shireen Gaucher    History of Presenting Illness     Chief Complaint   Patient presents with     Generalized weakness       Additional History (Context): Gravis Shireen Gaucher is a 80 y.o. female who presents with generalized weakness since this morning. Patient states it is not a bit of the ambulate secondary to her generalized weakness. Patient denies any focal weakness or sensory deficits. Patient states she has not had any fevers, chest pain, or shortness of breath associated symptoms. Patient is unsure why she is having these current symptoms. Patient states she lives at home with her 2 children. PCP: Gregory Rodríguez MD    Current Facility-Administered Medications   Medication Dose Route Frequency Provider Last Rate Last Dose    cefTRIAXone (ROCEPHIN) 1 g in 0.9% sodium chloride (MBP/ADV) 50 mL MBP  1 g IntraVENous NOW Carlota Au  mL/hr at 06/07/19 1728 1 g at 06/07/19 1728     Current Outpatient Medications   Medication Sig Dispense Refill    cefUROXime (CEFTIN) 500 mg tablet Take 1 Tab by mouth two (2) times a day for 5 days. 10 Tab 0    insulin lispro protamine/insulin lispro (HUMALOG MIX 75-25,U-100,INSULN) 100 unit/mL (75-25) injection 26 Units by SubCUTAneous route two (2) times a day.  folic acid (FOLVITE) 1 mg tablet Take 1 mg by mouth daily.  amLODIPine (NORVASC) 5 mg tablet Take 1 Tab by mouth daily. Indications: HYPERTENSION 30 Tab 0    lisinopril (PRINIVIL, ZESTRIL) 10 mg tablet Take 1 Tab by mouth daily. 30 Tab 0    CYANOCOBALAMIN, VITAMIN B-12, (VITAMIN B-12 PO) Take  by mouth.  aspirin 81 mg chewable tablet Take 81 mg by mouth daily.  CHOLECALCIFEROL, VITAMIN D3, (VITAMIN D3 PO) Take  by mouth.  ondansetron (ZOFRAN ODT) 4 mg disintegrating tablet Take 1 tablets every 6-8 hours as needed for nausea and vomiting.  10 Tab 0       Past History     Past Medical History:  Past Medical History:   Diagnosis Date    Diabetes ([facility] [address]     Hypertension        Past Surgical History:  Past Surgical History:   Procedure Laterality Date    HX HYSTERECTOMY  age 62       Family History:  Family History   Problem Relation Age of Onset    Breast Cancer Sister     Ovarian Cancer Sister        Social History:  Social History     Tobacco Use    Smoking status: Never Smoker    Smokeless tobacco: Never Used   Substance Use Topics    Alcohol use: Not Currently    Drug use: Never       Allergies: Allergies   Allergen Reactions    Actoplus Met [Pioglitazone-Metformin] Not Reported This Time    Celestone [Betamethasone] Not Reported This Time    Clarithromycin Not Reported This Time    Codeine Other (comments)    Flagyl [Metronidazole] Not Reported This Time    Levaquin [Levofloxacin] Itching    Metformin Not Reported This Time    Other Medication Not Reported This Time     genifor  Mercaine    Prednisone Not Reported This Time    Prevacid [Lansoprazole] Not Reported This Time    Sulfa (Sulfonamide Antibiotics) Not Reported This Time         Review of Systems     POSITIVES IN HPI    GENERAL/CONSTITUTIONAL: No weight loss. HEENT: No Vision change  CARDIOVASCULAR: No varicose veins. RESPIRATORY: No night sweats  SKIN: No nipple discharge. ENDOCRINE: No intolerance to cold temperature  HEMATOLOGIC/LYMPHATIC: No bleeding tendency. ALLERGIC/IMMUNOLOGIC: No latex allergies or sensitivity.     Remainder of systems Negative x10 systems total    Physical Exam     Visit Vitals  /50   Pulse 64   Temp 97.5 °F (36.4 °C)   Resp 17   Ht 5' 5\" (1.651 m)   Wt 99.8 kg (220 lb)   SpO2 98%   BMI 36.61 kg/m²         Physical Exam:  General - Alert and in no acute distress  Eyes - No conjunctival injection  ENT - Moist Mucous Membranes,   Neck - Trachea Midline  Lymph Nodes - No lymphadenopathy  Cardiovascular - RRR no m/r/g, no JVD, no carotid bruits  Lungs - No Respiratory Distress, Clear to auscultation bilaterally  Skin - No rashes, skin warm and dry  Psychiatry - Normal Affect, No SI  Abdomen - Abdomen soft and nontender  Extremeties - No gross deformities/edema, normal range of motion, no swollen joints. Neurological  CN 2-12 grossly intact, Strength and Sensation intact        Diagnostic Study Results     Labs -  Recent Results (from the past 12 hour(s))   EKG, 12 LEAD, INITIAL    Collection Time: 06/07/19  4:22 PM   Result Value Ref Range    Ventricular Rate 73 BPM    Atrial Rate 73 BPM    P-R Interval 206 ms    QRS Duration 96 ms    Q-T Interval 406 ms    QTC Calculation (Bezet) 447 ms    Calculated P Axis 57 degrees    Calculated R Axis -33 degrees    Calculated T Axis 17 degrees    Diagnosis       Normal sinus rhythm  Left axis deviation  Possible Anterolateral infarct , age undetermined  Abnormal ECG  When compared with ECG of 26-APR-2018 08:40,  Borderline criteria for Anterolateral infarct are now present     CBC WITH AUTOMATED DIFF    Collection Time: 06/07/19  4:33 PM   Result Value Ref Range    WBC 12.4 4.6 - 13.2 K/uL    RBC 4.21 4.20 - 5.30 M/uL    HGB 12.3 12.0 - 16.0 g/dL    HCT 38.8 35.0 - 45.0 %    MCV 92.2 74.0 - 97.0 FL    MCH 29.2 24.0 - 34.0 PG    MCHC 31.7 31.0 - 37.0 g/dL    RDW 14.1 11.6 - 14.5 %    PLATELET 696 749 - 006 K/uL    MPV 13.7 (H) 9.2 - 11.8 FL    NEUTROPHILS 70 40 - 73 %    LYMPHOCYTES 19 (L) 21 - 52 %    MONOCYTES 6 3 - 10 %    EOSINOPHILS 4 0 - 5 %    BASOPHILS 1 0 - 2 %    ABS. NEUTROPHILS 8.8 (H) 1.8 - 8.0 K/UL    ABS. LYMPHOCYTES 2.3 0.9 - 3.6 K/UL    ABS. MONOCYTES 0.8 0.05 - 1.2 K/UL    ABS. EOSINOPHILS 0.5 (H) 0.0 - 0.4 K/UL    ABS.  BASOPHILS 0.1 0.0 - 0.1 K/UL    DF AUTOMATED     METABOLIC PANEL, COMPREHENSIVE    Collection Time: 06/07/19  4:33 PM   Result Value Ref Range    Sodium 136 136 - 145 mmol/L    Potassium 3.6 3.5 - 5.5 mmol/L    Chloride 97 (L) 100 - 108 mmol/L    CO2 29 21 - 32 mmol/L    Anion gap 10 3.0 - 18 mmol/L    Glucose 379 (H) 74 - 99 mg/dL    BUN 23 (H) 7.0 - 18 MG/DL    Creatinine 1.28 0.6 - 1.3 MG/DL    BUN/Creatinine ratio 18 12 - 20      GFR est AA 47 (L) >60 ml/min/1.73m2    GFR est non-AA 39 (L) >60 ml/min/1.73m2    Calcium 8.5 8.5 - 10.1 MG/DL    Bilirubin, total 0.2 0.2 - 1.0 MG/DL    ALT (SGPT) 14 13 - 56 U/L    AST (SGOT) 13 (L) 15 - 37 U/L    Alk. phosphatase 128 (H) 45 - 117 U/L    Protein, total 7.0 6.4 - 8.2 g/dL    Albumin 3.1 (L) 3.4 - 5.0 g/dL    Globulin 3.9 2.0 - 4.0 g/dL    A-G Ratio 0.8 0.8 - 1.7     MAGNESIUM    Collection Time: 06/07/19  4:33 PM   Result Value Ref Range    Magnesium 1.7 1.6 - 2.6 mg/dL   TROPONIN I    Collection Time: 06/07/19  4:33 PM   Result Value Ref Range    Troponin-I, QT <0.02 0.0 - 0.045 NG/ML   TSH 3RD GENERATION    Collection Time: 06/07/19  4:33 PM   Result Value Ref Range    TSH 2.53 0.36 - 3.74 uIU/mL   URINALYSIS W/ RFLX MICROSCOPIC    Collection Time: 06/07/19  5:00 PM   Result Value Ref Range    Color YELLOW      Appearance CLEAR      Specific gravity 1.016 1.005 - 1.030      pH (UA) 6.0 5.0 - 8.0      Protein NEGATIVE  NEG mg/dL    Glucose >1,000 (A) NEG mg/dL    Ketone NEGATIVE  NEG mg/dL    Bilirubin NEGATIVE  NEG      Blood NEGATIVE  NEG      Urobilinogen 0.2 0.2 - 1.0 EU/dL    Nitrites NEGATIVE  NEG      Leukocyte Esterase MODERATE (A) NEG     URINE MICROSCOPIC ONLY    Collection Time: 06/07/19  5:00 PM   Result Value Ref Range    WBC 4 to 10 0 - 4 /hpf    RBC 0 to 3 0 - 5 /hpf    Epithelial cells 1+ 0 - 5 /lpf    Bacteria NEGATIVE  NEG /hpf    Amorphous Crystals 1+ (A) NEG        Radiologic Studies -   XR CHEST PORT   Final Result   IMPRESSION:      No acute cardiopulmonary process. Medical Decision Making   I am the first provider for this patient. I reviewed the vital signs, available nursing notes, past medical history, past surgical history, family history and social history. Vital Signs-Reviewed the patient's vital signs.     Provider Notes (Medical Decision Making):   MDM: Patient is having generalized weakness will evaluate with EKG, labs, and x-ray. Concern for anemia, electrolyte imbalance, arrhythmia, MI, infection, or hypothyroid amongst other pathology exists. 500 mL IV fluid bolus ordered in case dehydration is a cause of her symptoms. ED Course: Progress Notes, Reevaluation, and Consults:  1750 work-up shows no acute disease process except some leukocytosis and urinalysis which is suspicious for UTI therefore 1 dose of ceftriaxone was given to the patient. I started patient on Ceftin. I offered admission versus discharge of the patient and she requested to be discharged since there is nothing acute going on based on her emergency department evaluation. I spoke with son at length and explained to him to follow-up with primary care physician next week to make sure patient is healing well and also recommended that they discuss home health aide as well as physical therapy. Diagnosis     Clinical Impression:   1. Generalized weakness    2. Lower urinary tract infectious disease        Disposition: Discharged     Follow-up Information     Follow up With Specialties Details Why Contact Info    Marta Emery MD  In 2 days See him for medical follow up and discuss home health aide and home physical therapy [address] [place] [address]             Patient's Medications   Start Taking    CEFUROXIME (CEFTIN) 500 MG TABLET    Take 1 Tab by mouth two (2) times a day for 5 days. Continue Taking    AMLODIPINE (NORVASC) 5 MG TABLET    Take 1 Tab by mouth daily. Indications: HYPERTENSION    ASPIRIN 81 MG CHEWABLE TABLET    Take 81 mg by mouth daily. CHOLECALCIFEROL, VITAMIN D3, (VITAMIN D3 PO)    Take  by mouth. CYANOCOBALAMIN, VITAMIN B-12, (VITAMIN B-12 PO)    Take  by mouth. FOLIC ACID (FOLVITE) 1 MG TABLET    Take 1 mg by mouth daily.     INSULIN LISPRO PROTAMINE/INSULIN LISPRO (HUMALOG MIX 75-25,U-100,INSULN) 100 UNIT/ML (75-25) INJECTION    26 Units by SubCUTAneous route two (2) times a day. LISINOPRIL (PRINIVIL, ZESTRIL) 10 MG TABLET    Take 1 Tab by mouth daily. ONDANSETRON (ZOFRAN ODT) 4 MG DISINTEGRATING TABLET    Take 1 tablets every 6-8 hours as needed for nausea and vomiting. These Medications have changed    No medications on file   Stop Taking    No medications on file       _______________________________    Disclaimer: Note is dictated utilizing voice recognition software. Unfortunately this leads to occasional typographical errors. I apologize in advance if the situation occurs. If questions occur please do not hesitate to call our department.

## 2019-06-07 NOTE — DISCHARGE INSTRUCTIONS
Patient Education        Muscle Conditioning: Exercises  Your Care Instructions  Here are some examples of exercises for muscle conditioning. Start each exercise slowly. Ease off the exercise if you start to have pain. Your doctor or physical therapist will tell you when you can start these exercises and which ones will work best for you. How to do the exercises  Wall push-ups    1. Stand facing a wall, about 12 to 18 inches away. 2. Place your hands on the wall at shoulder height. 3. Slowly bend your elbows and bring your face toward the wall, moving your hips and shoulders forward together. 4. Push slowly back to the starting position. 5. Start with 5 repetitions and work up to 8 to 12. 6. Rest for a minute, and repeat the exercise. Knee extension    1. While sitting in a chair, straighten one leg and hold while you slowly count to 5. Be sure you do not lock your knee. 2. Repeat 8 to 12 times. 3. Rest for a minute, and repeat the exercise. 4. Do the same exercise with the other leg. Side-lying leg lift    1. Lie on your side, with your legs extended. Keep your hips straight up and down during this exercise. Do not let your top hip rock toward the back. Support your head with your hand, and place the other hand on the floor near your waist.  2. Slowly raise your upper leg until it is about in line with your shoulder. Keep your toes pointed forward. 3. Slowly lower your leg to the starting position. 4. Repeat 8 to 12 times. 5. Rest for a minute, and repeat the exercise. 6. Turn to your other side and do the same exercise with your other leg. Shallow standing knee bends    1. Stand with your hands lightly resting on a counter or chair in front of you with your feet shoulder-width apart. 2. Slowly bend your knees so that you squat down just like you were going to sit in a chair. Make sure your knees do not go in front of your toes. 3. Lower yourself about 6 inches.  Your heels should remain on the floor at all times. 4. Rise slowly to a standing position. 5. Repeat 8 to 12 times. 6. Rest for a minute, and repeat the exercise. Follow-up care is a key part of your treatment and safety. Be sure to make and go to all appointments, and call your doctor if you are having problems. It's also a good idea to know your test results and keep a list of the medicines you take. Where can you learn more? Go to http://lashonda-romeo.info/. Enter Q093 in the search box to learn more about \"Muscle Conditioning: Exercises. \"  Current as of: August 19, 2018  Content Version: 11.9  © 9415-8644 [employer]. Care instructions adapted under license by [employer] (which disclaims liability or warranty for this information). If you have questions about a medical condition or this instruction, always ask your healthcare professional. [place] [address] any warranty or liability for your use of this information. Patient Education        Urinary Tract Infection in Women: Care Instructions  Your Care Instructions    A urinary tract infection, or UTI, is a general term for an infection anywhere between the kidneys and the urethra (where urine comes out). Most UTIs are bladder infections. They often cause pain or burning when you urinate. UTIs are caused by bacteria and can be cured with antibiotics. Be sure to complete your treatment so that the infection goes away. Follow-up care is a key part of your treatment and safety. Be sure to make and go to all appointments, and call your doctor if you are having problems. It's also a good idea to know your test results and keep a list of the medicines you take. How can you care for yourself at home? · Take your antibiotics as directed. Do not stop taking them just because you feel better. You need to take the full course of antibiotics. · Drink extra water and other fluids for the next day or two.  This may help wash out the bacteria that are causing the infection. (If you have kidney, heart, or liver disease and have to limit fluids, talk with your doctor before you increase your fluid intake.)  · Avoid drinks that are carbonated or have caffeine. They can irritate the bladder. · Urinate often. Try to empty your bladder each time. · To relieve pain, take a hot bath or lay a heating pad set on low over your lower belly or genital area. Never go to sleep with a heating pad in place. To prevent UTIs  · Drink plenty of water each day. This helps you urinate often, which clears bacteria from your system. (If you have kidney, heart, or liver disease and have to limit fluids, talk with your doctor before you increase your fluid intake.)  · Urinate when you need to. · Urinate right after you have sex. · Change sanitary pads often. · Avoid douches, bubble baths, feminine hygiene sprays, and other feminine hygiene products that have deodorants. · After going to the bathroom, wipe from front to back. When should you call for help? Call your doctor now or seek immediate medical care if:    · Symptoms such as fever, chills, nausea, or vomiting get worse or appear for the first time.     · You have new pain in your back just below your rib cage. This is called flank pain.     · There is new blood or pus in your urine.     · You have any problems with your antibiotic medicine.    Watch closely for changes in your health, and be sure to contact your doctor if:    · You are not getting better after taking an antibiotic for 2 days.     · Your symptoms go away but then come back. Where can you learn more? Go to http://lashonda-romeo.info/. Enter H346 in the search box to learn more about \"Urinary Tract Infection in Women: Care Instructions. \"  Current as of: March 20, 2018  Content Version: 11.9  © 9900-1786 [employer].  Care instructions adapted under license by [employer] (which disclaims liability or warranty for this information). If you have questions about a medical condition or this instruction, always ask your healthcare professional. [address] any warranty or liability for your use of this information.

## 2019-06-08 NOTE — ED NOTES
I have reviewed discharge instructions with the patient and son. The patient and son verbalized understanding. Patient requires medical transport to return home. Arrangement will be made.

## 2019-06-08 NOTE — ED NOTES
Call placed to patients son Martin Patel to inform him that transport is here to take the patient home at this time.

## 2019-10-05 ENCOUNTER — APPOINTMENT (OUTPATIENT)
Dept: GENERAL RADIOLOGY | Age: 84
DRG: 064 | End: 2019-10-05
Attending: EMERGENCY MEDICINE
Payer: MEDICARE

## 2019-10-05 ENCOUNTER — HOSPITAL ENCOUNTER (INPATIENT)
Age: 84
LOS: 3 days | Discharge: HOME HEALTH | DRG: 064 | End: 2019-10-08
Attending: EMERGENCY MEDICINE | Admitting: INTERNAL MEDICINE
Payer: MEDICARE

## 2019-10-05 ENCOUNTER — APPOINTMENT (OUTPATIENT)
Dept: CT IMAGING | Age: 84
DRG: 064 | End: 2019-10-05
Attending: EMERGENCY MEDICINE
Payer: MEDICARE

## 2019-10-05 DIAGNOSIS — N39.0 LOWER URINARY TRACT INFECTIOUS DISEASE: ICD-10-CM

## 2019-10-05 DIAGNOSIS — R41.82 ALTERED MENTAL STATUS, UNSPECIFIED ALTERED MENTAL STATUS TYPE: Primary | ICD-10-CM

## 2019-10-05 DIAGNOSIS — E87.6 HYPOKALEMIA: ICD-10-CM

## 2019-10-05 DIAGNOSIS — D72.829 LEUKOCYTOSIS, UNSPECIFIED TYPE: ICD-10-CM

## 2019-10-05 PROBLEM — G93.40 ACUTE ENCEPHALOPATHY: Status: ACTIVE | Noted: 2019-10-05

## 2019-10-05 PROBLEM — E11.9 DIABETES MELLITUS TYPE 2, CONTROLLED (HCC): Status: ACTIVE | Noted: 2019-10-05

## 2019-10-05 PROBLEM — E66.9 OBESITY: Status: ACTIVE | Noted: 2019-10-05

## 2019-10-05 PROBLEM — G45.9 TIA (TRANSIENT ISCHEMIC ATTACK): Status: ACTIVE | Noted: 2019-10-05

## 2019-10-05 LAB
ALBUMIN SERPL-MCNC: 3.1 G/DL (ref 3.4–5)
ALBUMIN/GLOB SERPL: 0.6 {RATIO} (ref 0.8–1.7)
ALP SERPL-CCNC: 94 U/L (ref 45–117)
ALT SERPL-CCNC: 15 U/L (ref 13–56)
ANION GAP SERPL CALC-SCNC: 8 MMOL/L (ref 3–18)
APPEARANCE UR: ABNORMAL
AST SERPL-CCNC: 15 U/L (ref 10–38)
BACTERIA URNS QL MICRO: ABNORMAL /HPF
BASOPHILS # BLD: 0 K/UL (ref 0–0.1)
BASOPHILS NFR BLD: 0 % (ref 0–2)
BILIRUB SERPL-MCNC: 0.4 MG/DL (ref 0.2–1)
BILIRUB UR QL: NEGATIVE
BUN SERPL-MCNC: 23 MG/DL (ref 7–18)
BUN/CREAT SERPL: 17 (ref 12–20)
CALCIUM SERPL-MCNC: 9.2 MG/DL (ref 8.5–10.1)
CHLORIDE SERPL-SCNC: 99 MMOL/L (ref 100–111)
CO2 SERPL-SCNC: 30 MMOL/L (ref 21–32)
COLOR UR: YELLOW
CREAT SERPL-MCNC: 1.32 MG/DL (ref 0.6–1.3)
DIFFERENTIAL METHOD BLD: ABNORMAL
EOSINOPHIL # BLD: 0.2 K/UL (ref 0–0.4)
EOSINOPHIL NFR BLD: 1 % (ref 0–5)
EPITH CASTS URNS QL MICRO: ABNORMAL /LPF (ref 0–5)
ERYTHROCYTE [DISTWIDTH] IN BLOOD BY AUTOMATED COUNT: 14.3 % (ref 11.6–14.5)
GLOBULIN SER CALC-MCNC: 5 G/DL (ref 2–4)
GLUCOSE BLD STRIP.AUTO-MCNC: 181 MG/DL (ref 70–110)
GLUCOSE SERPL-MCNC: 82 MG/DL (ref 74–99)
GLUCOSE UR STRIP.AUTO-MCNC: NEGATIVE MG/DL
HCT VFR BLD AUTO: 39.2 % (ref 35–45)
HGB BLD-MCNC: 12.6 G/DL (ref 12–16)
HGB UR QL STRIP: NEGATIVE
KETONES UR QL STRIP.AUTO: NEGATIVE MG/DL
LEUKOCYTE ESTERASE UR QL STRIP.AUTO: ABNORMAL
LIPASE SERPL-CCNC: 85 U/L (ref 73–393)
LYMPHOCYTES # BLD: 3.7 K/UL (ref 0.9–3.6)
LYMPHOCYTES NFR BLD: 25 % (ref 21–52)
MAGNESIUM SERPL-MCNC: 1.7 MG/DL (ref 1.6–2.6)
MCH RBC QN AUTO: 29.9 PG (ref 24–34)
MCHC RBC AUTO-ENTMCNC: 32.1 G/DL (ref 31–37)
MCV RBC AUTO: 93.1 FL (ref 74–97)
MONOCYTES # BLD: 0.9 K/UL (ref 0.05–1.2)
MONOCYTES NFR BLD: 6 % (ref 3–10)
NEUTS SEG # BLD: 10.1 K/UL (ref 1.8–8)
NEUTS SEG NFR BLD: 68 % (ref 40–73)
NITRITE UR QL STRIP.AUTO: NEGATIVE
PH UR STRIP: 6 [PH] (ref 5–8)
PLATELET # BLD AUTO: 217 K/UL (ref 135–420)
PMV BLD AUTO: 14.1 FL (ref 9.2–11.8)
POTASSIUM SERPL-SCNC: 3.2 MMOL/L (ref 3.5–5.5)
PROT SERPL-MCNC: 8.1 G/DL (ref 6.4–8.2)
PROT UR STRIP-MCNC: NEGATIVE MG/DL
RBC # BLD AUTO: 4.21 M/UL (ref 4.2–5.3)
RBC #/AREA URNS HPF: ABNORMAL /HPF (ref 0–5)
SODIUM SERPL-SCNC: 137 MMOL/L (ref 136–145)
SP GR UR REFRACTOMETRY: 1.01 (ref 1–1.03)
TROPONIN I SERPL-MCNC: <0.02 NG/ML (ref 0–0.04)
UROBILINOGEN UR QL STRIP.AUTO: 0.2 EU/DL (ref 0.2–1)
WBC # BLD AUTO: 14.9 K/UL (ref 4.6–13.2)
WBC URNS QL MICRO: ABNORMAL /HPF (ref 0–4)

## 2019-10-05 PROCEDURE — 74011250636 HC RX REV CODE- 250/636: Performed by: EMERGENCY MEDICINE

## 2019-10-05 PROCEDURE — 74011250637 HC RX REV CODE- 250/637: Performed by: PHYSICIAN ASSISTANT

## 2019-10-05 PROCEDURE — 99285 EMERGENCY DEPT VISIT HI MDM: CPT

## 2019-10-05 PROCEDURE — 87077 CULTURE AEROBIC IDENTIFY: CPT

## 2019-10-05 PROCEDURE — 70450 CT HEAD/BRAIN W/O DYE: CPT

## 2019-10-05 PROCEDURE — 81001 URINALYSIS AUTO W/SCOPE: CPT

## 2019-10-05 PROCEDURE — 74011000258 HC RX REV CODE- 258: Performed by: EMERGENCY MEDICINE

## 2019-10-05 PROCEDURE — 96375 TX/PRO/DX INJ NEW DRUG ADDON: CPT

## 2019-10-05 PROCEDURE — 84484 ASSAY OF TROPONIN QUANT: CPT

## 2019-10-05 PROCEDURE — 74011250636 HC RX REV CODE- 250/636: Performed by: PHYSICIAN ASSISTANT

## 2019-10-05 PROCEDURE — 83735 ASSAY OF MAGNESIUM: CPT

## 2019-10-05 PROCEDURE — 71045 X-RAY EXAM CHEST 1 VIEW: CPT

## 2019-10-05 PROCEDURE — 96374 THER/PROPH/DIAG INJ IV PUSH: CPT

## 2019-10-05 PROCEDURE — 74011636637 HC RX REV CODE- 636/637: Performed by: PHYSICIAN ASSISTANT

## 2019-10-05 PROCEDURE — 87186 SC STD MICRODIL/AGAR DIL: CPT

## 2019-10-05 PROCEDURE — 87086 URINE CULTURE/COLONY COUNT: CPT

## 2019-10-05 PROCEDURE — 65270000029 HC RM PRIVATE

## 2019-10-05 PROCEDURE — 93005 ELECTROCARDIOGRAM TRACING: CPT

## 2019-10-05 PROCEDURE — 85025 COMPLETE CBC W/AUTO DIFF WBC: CPT

## 2019-10-05 PROCEDURE — 80053 COMPREHEN METABOLIC PANEL: CPT

## 2019-10-05 PROCEDURE — 83690 ASSAY OF LIPASE: CPT

## 2019-10-05 PROCEDURE — 82962 GLUCOSE BLOOD TEST: CPT

## 2019-10-05 RX ORDER — CEFTRIAXONE 1 G/1
1 INJECTION, POWDER, FOR SOLUTION INTRAMUSCULAR; INTRAVENOUS
Status: DISCONTINUED | OUTPATIENT
Start: 2019-10-05 | End: 2019-10-05

## 2019-10-05 RX ORDER — INSULIN GLARGINE 100 [IU]/ML
10 INJECTION, SOLUTION SUBCUTANEOUS
COMMUNITY
End: 2021-10-16

## 2019-10-05 RX ORDER — ONDANSETRON 2 MG/ML
4 INJECTION INTRAMUSCULAR; INTRAVENOUS
Status: COMPLETED | OUTPATIENT
Start: 2019-10-05 | End: 2019-10-05

## 2019-10-05 RX ORDER — MAGNESIUM SULFATE 100 %
4 CRYSTALS MISCELLANEOUS AS NEEDED
Status: DISCONTINUED | OUTPATIENT
Start: 2019-10-05 | End: 2019-10-08 | Stop reason: HOSPADM

## 2019-10-05 RX ORDER — ATORVASTATIN CALCIUM 40 MG/1
80 TABLET, FILM COATED ORAL
Status: DISCONTINUED | OUTPATIENT
Start: 2019-10-05 | End: 2019-10-08 | Stop reason: HOSPADM

## 2019-10-05 RX ORDER — GUAIFENESIN 100 MG/5ML
81 LIQUID (ML) ORAL DAILY
Status: DISCONTINUED | OUTPATIENT
Start: 2019-10-06 | End: 2019-10-06

## 2019-10-05 RX ORDER — ACETAMINOPHEN 325 MG/1
650 TABLET ORAL
Status: DISCONTINUED | OUTPATIENT
Start: 2019-10-05 | End: 2019-10-08 | Stop reason: HOSPADM

## 2019-10-05 RX ORDER — GUAIFENESIN 100 MG/5ML
81 LIQUID (ML) ORAL DAILY
Status: DISCONTINUED | OUTPATIENT
Start: 2019-10-06 | End: 2019-10-07

## 2019-10-05 RX ORDER — POTASSIUM CHLORIDE 7.45 MG/ML
10 INJECTION INTRAVENOUS
Status: COMPLETED | OUTPATIENT
Start: 2019-10-05 | End: 2019-10-06

## 2019-10-05 RX ORDER — POTASSIUM CHLORIDE 20 MEQ/1
40 TABLET, EXTENDED RELEASE ORAL
Status: DISPENSED | OUTPATIENT
Start: 2019-10-05 | End: 2019-10-06

## 2019-10-05 RX ORDER — HEPARIN SODIUM 5000 [USP'U]/ML
5000 INJECTION, SOLUTION INTRAVENOUS; SUBCUTANEOUS EVERY 8 HOURS
Status: DISCONTINUED | OUTPATIENT
Start: 2019-10-05 | End: 2019-10-08 | Stop reason: HOSPADM

## 2019-10-05 RX ORDER — LISINOPRIL 40 MG/1
40 TABLET ORAL DAILY
COMMUNITY
Start: 2019-10-05

## 2019-10-05 RX ORDER — ERGOCALCIFEROL 1.25 MG/1
50000 CAPSULE ORAL
COMMUNITY
Start: 2019-10-05

## 2019-10-05 RX ORDER — INSULIN LISPRO 100 [IU]/ML
INJECTION, SOLUTION INTRAVENOUS; SUBCUTANEOUS
Status: DISCONTINUED | OUTPATIENT
Start: 2019-10-05 | End: 2019-10-07

## 2019-10-05 RX ORDER — FUROSEMIDE 40 MG/1
40 TABLET ORAL DAILY
COMMUNITY
End: 2019-10-16

## 2019-10-05 RX ORDER — LABETALOL HYDROCHLORIDE 5 MG/ML
5 INJECTION, SOLUTION INTRAVENOUS
Status: DISCONTINUED | OUTPATIENT
Start: 2019-10-05 | End: 2019-10-08 | Stop reason: HOSPADM

## 2019-10-05 RX ADMIN — POTASSIUM CHLORIDE 10 MEQ: 10 INJECTION, SOLUTION INTRAVENOUS at 22:50

## 2019-10-05 RX ADMIN — ATORVASTATIN CALCIUM 80 MG: 40 TABLET, FILM COATED ORAL at 22:51

## 2019-10-05 RX ADMIN — HEPARIN SODIUM 5000 UNITS: 5000 INJECTION INTRAVENOUS; SUBCUTANEOUS at 20:41

## 2019-10-05 RX ADMIN — POTASSIUM CHLORIDE 10 MEQ: 10 INJECTION, SOLUTION INTRAVENOUS at 20:41

## 2019-10-05 RX ADMIN — ONDANSETRON 4 MG: 2 INJECTION INTRAMUSCULAR; INTRAVENOUS at 14:03

## 2019-10-05 RX ADMIN — SODIUM CHLORIDE 500 ML: 900 INJECTION, SOLUTION INTRAVENOUS at 14:03

## 2019-10-05 RX ADMIN — POTASSIUM CHLORIDE 10 MEQ: 10 INJECTION, SOLUTION INTRAVENOUS at 21:50

## 2019-10-05 RX ADMIN — CEFTRIAXONE 1 G: 1 INJECTION, POWDER, FOR SOLUTION INTRAMUSCULAR; INTRAVENOUS at 15:45

## 2019-10-05 RX ADMIN — INSULIN LISPRO 2 UNITS: 100 INJECTION, SOLUTION INTRAVENOUS; SUBCUTANEOUS at 22:50

## 2019-10-05 RX ADMIN — POTASSIUM CHLORIDE 10 MEQ: 10 INJECTION, SOLUTION INTRAVENOUS at 23:54

## 2019-10-05 NOTE — ED PROVIDER NOTES
EMERGENCY DEPARTMENT HISTORY AND PHYSICAL EXAM    1:38 PM      Date: 10/5/2019  Patient Name: Darren Stafford    History of Presenting Illness     Chief Complaint   Patient presents with    Fatigue    Altered mental status         HISTORY: Charlene Salazar is a 80 y.o. female with medical history as listed below who presents with confusion and weakness for the last 3 days. Per the son at the bedside she has been confused with things at home such as where she is and where the bathroom is. She is also been increasingly weak and had poor oral intake in the last 2 days. She does have a history of getting urinary tract infections but he denies any recent falls. She was able to ambulate from her bedroom to the living room to get on the EMS stretcher today. Her speech has been normal.  She has no headache, chest pain, difficulty breathing, abdominal pain, vomiting or dysuria. She feels nauseous. She did eat breakfast.    PCP: Mario Slaughter MD    Current Facility-Administered Medications   Medication Dose Route Frequency Provider Last Rate Last Dose    sodium chloride 0.9 % bolus infusion 500 mL  500 mL IntraVENous ONCE Janettsarah Mccoy MD         Current Outpatient Medications   Medication Sig Dispense Refill    lisinopril (PRINIVIL, ZESTRIL) 40 mg tablet Take 40 mg by mouth daily.  ergocalciferol (VITAMIN D2) 50,000 unit capsule Take 50,000 Units by mouth every seven (7) days.  furosemide (LASIX) 40 mg tablet Take 40 mg by mouth daily.  insulin glargine (LANTUS SOLOSTAR U-100 INSULIN) 100 unit/mL (3 mL) inpn by SubCUTAneous route nightly.  insulin lispro protamine/insulin lispro (HUMALOG MIX 75-25,U-100,INSULN) 100 unit/mL (75-25) injection 26 Units by SubCUTAneous route two (2) times a day.  folic acid (FOLVITE) 1 mg tablet Take 1 mg by mouth daily.  ondansetron (ZOFRAN ODT) 4 mg disintegrating tablet Take 1 tablets every 6-8 hours as needed for nausea and vomiting.  10 Tab 0  amLODIPine (NORVASC) 5 mg tablet Take 1 Tab by mouth daily. Indications: HYPERTENSION 30 Tab 0    lisinopril (PRINIVIL, ZESTRIL) 10 mg tablet Take 1 Tab by mouth daily. 30 Tab 0    CYANOCOBALAMIN, VITAMIN B-12, (VITAMIN B-12 PO) Take  by mouth.  aspirin 81 mg chewable tablet Take 81 mg by mouth daily.  CHOLECALCIFEROL, VITAMIN D3, (VITAMIN D3 PO) Take  by mouth. Past History     Past Medical History:  Past Medical History:   Diagnosis Date    Diabetes ([address]     Hypertension        Past Surgical History:  Past Surgical History:   Procedure Laterality Date    HX HYSTERECTOMY  age 62   [de-identified] ORTHOPAEDIC         Family History:  Family History   Problem Relation Age of Onset    Breast Cancer Sister     Ovarian Cancer Sister        Social History:  Social History     Tobacco Use    Smoking status: Never Smoker    Smokeless tobacco: Never Used   Substance Use Topics    Alcohol use: Not Currently    Drug use: Never       Allergies: Allergies   Allergen Reactions    Actoplus Met [Pioglitazone-Metformin] Not Reported This Time    Celestone [Betamethasone] Not Reported This Time    Clarithromycin Not Reported This Time    Codeine Other (comments)    Flagyl [Metronidazole] Not Reported This Time    Levaquin [Levofloxacin] Itching    Metformin Not Reported This Time    Other Medication Not Reported This Time     genifor  Mercaine    Prednisone Not Reported This Time    Prevacid [Lansoprazole] Not Reported This Time    Sulfa (Sulfonamide Antibiotics) Not Reported This Time         Review of Systems       Review of Systems   Unable to perform ROS: Mental status change         Physical Exam     Visit Vitals  /60 (BP 1 Location: Left arm, BP Patient Position: At rest)   Pulse 73   Temp 97.9 °F (36.6 °C)   Resp 23   SpO2 99%     Physical Exam  General Exam: Patient is well developed and well nourished in no distress. Patient does not appear acutely ill or toxic.   Elderly,  Eye Exam: Lids and conjunctiva are normal  ENT Exam: The general head and facial exam is normal.  The neck is supple without meningeal signs. No significant adenopathy. Pulmonary Exam: No respiratory distress. The respiratory rate is normal.  No stridor. The breath sounds are equal bilaterally. There are no wheezes, rales, or rhonchi noted. Cardiac Exam: The cardiac rate and rhythm are normal.  No significant murmurs, rubs, or gallops. The peripheral pulses of the upper extremities are normal.  Abdominal Exam: Abdomen is soft and non-distended. No pulsatile masses. There is no local tenderness. There is no rebound or guarding noted. Skin and Soft Tissue: The skin is warm and dry, without significant abnormality. Good color. Musculoskeletal Exam: There is no clubbing or cyanosis. There is no peripheral edema. The musculoskeletal exam of the lower extremities is normal without significant local tenderness or spasm. Neurologic: Pt is alert and appropriate. Normal speech. Normal symmetric muscle strength and tone in all extremities. Psychiatric: Normal adult with appropriate demeanor and interpersonal interaction. Is oriented to person, place (knows she is at [facility]), not know the month or year which her son states is normal for her.         Diagnostic Study Results     Labs -  Recent Results (from the past 12 hour(s))   EKG, 12 LEAD, INITIAL    Collection Time: 10/05/19  1:16 PM   Result Value Ref Range    Ventricular Rate 68 BPM    Atrial Rate 68 BPM    P-R Interval 198 ms    QRS Duration 94 ms    Q-T Interval 404 ms    QTC Calculation (Bezet) 429 ms    Calculated P Axis 41 degrees    Calculated R Axis -24 degrees    Calculated T Axis 7 degrees    Diagnosis       Normal sinus rhythm  Possible Lateral infarct (cited on or before 30-DEC-2013)  Abnormal ECG  When compared with ECG of 07-JUN-2019 16:22,  No significant change was found         Radiologic Studies -   XR CHEST PORT    (Results Pending)   CT HEAD WO CONT    (Results Pending)         Medical Decision Making   I am the first provider for this patient. I reviewed the vital signs, available nursing notes, past medical history, past surgical history, family history and social history. Vital Signs-Reviewed the patient's vital signs. EKG: Interpreted by the EP. EKG performed at 1316. Interpretation rate 68, normal sinus rhythm, no STEMI, no ST depressions    Records Reviewed: Nursing Notes (Time of Review: 1:38 PM)    ED Course: Progress Notes, Reevaluation, and Consults:      Provider Notes (Medical Decision Making): Patient presenting for evaluation of weakness and altered mental status. Currently she is able to answer most questions appropriately but son says she is not at her baseline. Vitals are normal.  She does not appear septic. No focal deficits to suggest an acute stroke today. Her symptoms have been present for 3 days and would be outside of any window for treatment. Mild leukocytosis. K3.2 which was replaced orally. Urine with some markers for infection so we will treat pending a urine culture. CT scan without acute findings but does show age-indeterminate determinant infarcts. Patient does not have a known history of strokes. As she has not returned yet to her baseline will admit for ongoing management. Consult:  Discussed care with Dr. Mertha Hatchet (hospitalist). Standard discussion; including history of patients chief complaint, available diagnostic results, and treatment course. Will see pt for admission. 3:11 PM, 10/5/2019         Diagnosis     Clinical Impression:   1. Altered mental status, unspecified altered mental status type    2. Lower urinary tract infectious disease    3. Leukocytosis, unspecified type    4.  Hypokalemia        Disposition: DC    Follow-up Information    None          Patient's Medications   Start Taking    No medications on file   Continue Taking    AMLODIPINE (NORVASC) 5 MG TABLET    Take 1 Tab by mouth daily. Indications: HYPERTENSION    ASPIRIN 81 MG CHEWABLE TABLET    Take 81 mg by mouth daily. CHOLECALCIFEROL, VITAMIN D3, (VITAMIN D3 PO)    Take  by mouth. CYANOCOBALAMIN, VITAMIN B-12, (VITAMIN B-12 PO)    Take  by mouth. ERGOCALCIFEROL (VITAMIN D2) 50,000 UNIT CAPSULE    Take 50,000 Units by mouth every seven (7) days. FOLIC ACID (FOLVITE) 1 MG TABLET    Take 1 mg by mouth daily. FUROSEMIDE (LASIX) 40 MG TABLET    Take 40 mg by mouth daily. INSULIN GLARGINE (LANTUS SOLOSTAR U-100 INSULIN) 100 UNIT/ML (3 ML) INPN    by SubCUTAneous route nightly. INSULIN LISPRO PROTAMINE/INSULIN LISPRO (HUMALOG MIX 75-25,U-100,INSULN) 100 UNIT/ML (75-25) INJECTION    26 Units by SubCUTAneous route two (2) times a day. LISINOPRIL (PRINIVIL, ZESTRIL) 10 MG TABLET    Take 1 Tab by mouth daily. LISINOPRIL (PRINIVIL, ZESTRIL) 40 MG TABLET    Take 40 mg by mouth daily. ONDANSETRON (ZOFRAN ODT) 4 MG DISINTEGRATING TABLET    Take 1 tablets every 6-8 hours as needed for nausea and vomiting. These Medications have changed    No medications on file   Stop Taking    No medications on file     _______________________________    Please note that this dictation was completed with [email or web address], the computer voice recognition software. Quite often unanticipated grammatical, syntax, homophones, and other interpretive errors are inadvertently transcribed by the computer software. Please disregard these errors. Please excuse any errors that have escaped final proofreading.

## 2019-10-05 NOTE — ED NOTES
TRANSFER - ED to INPATIENT REPORT:    SBAR report made available to receiving floor on this patient being transferred to [address] [place] [address] ([facility])  for routine progression of care       Admitting diagnosis TIA (transient ischemic attack) [G45.9]    Information from the following report(s) SBAR, ED Summary, MAR, Recent Results and Cardiac Rhythm Sinus was made available to receiving floor. Lines:   Peripheral IV 10/05/19 Left Antecubital (Active)   Site Assessment Clean, dry, & intact 10/5/2019  1:09 PM   Phlebitis Assessment 0 10/5/2019  1:09 PM   Infiltration Assessment 0 10/5/2019  1:09 PM   Dressing Status Clean, dry, & intact 10/5/2019  1:09 PM   Dressing Type Transparent 10/5/2019  1:09 PM   Hub Color/Line Status Green 10/5/2019  1:09 PM        Medication list updated today    Opportunity for questions and clarification was provided. Patient is only aware of  place, person and situation Alert  Patient is  incontinent and ambulatory with assist     Valuables transported with patient     Patient transported with:   Monitor  Registered Nurse    MAP (Monitor): 119 =Monitored (most recent)  Vitals w/ MEWS Score (last day)     Date/Time MEWS Score Pulse Resp Temp BP Level of Consciousness SpO2    10/05/19 1838  1  74  18  98 °F (36.7 °C)  153/59  Alert  98 %    10/05/19 1730  1  74  18  98.5 °F (36.9 °C)  (!) 136/114  Alert  100 %    10/05/19 1500    61      144/65    99 %    10/05/19 1430    (!) 10      163/43    100 %    10/05/19 1400    66  18    155/69    99 %    10/05/19 1300    73  23  97.9 °F (36.6 °C)  160/60    99 %              Septic Patients:     Lactic Acid  No results found for: LACPOC (Most recent on top)  Repeat drawn: NO Time: n/a     ALL LACTIC ACIDS GREATER THAN 2 MUST BE REPEATED POC WITHIN 4 HOURS OR PRIOR TO ADMISSION               Total Fluid Bolus initiated and documented on MAR: NO    All ordered antibiotics initiated within first 3 hours of TIME ZERO?   NO/n/a

## 2019-10-05 NOTE — H&P
Internal Medicine History and Physical          Subjective     HPI: Charlene Thompson is a 80 y.o. female with a noted PMHx of who presented to the ED with worsening mentation/confusion since Thursday. Patient lives with her son who is her active caregiver. He notes that while she was ambulating towards the bathroom with her walker she forgot which way the bathroom was. This confusion has persisted. He and the patient deny focal weakness, facial droop, visual changes, or any other neurological symptoms. No TIA/CVA hx that they are aware of. He notes she has had UTIs before in the past but none that have caused acute encephalopathy like symptoms. She denies urinary symptoms. Per son patient is a DNR, and that the document was uploaded to the system today in the ED. In the ED CT head concerning for age-indeterminate infarctions involving the left occipital lobe and left issa, UA with UTI, culture pending, Potassium to be replaced on floor as patient will be NPO for TIA/CVA workup.   Will admit for further eval    PMHx:  Past Medical History:   Diagnosis Date    Diabetes ([facility] [address]     Hypertension        PSurgHx:  Past Surgical History:   Procedure Laterality Date    HX HYSTERECTOMY  age 62   [facility]         SocialHx:  Social History     Socioeconomic History    Marital status:      Spouse name: Not on file    Number of children: Not on file    Years of education: Not on file    Highest education level: Not on file   Occupational History    Not on file   Social Needs    Financial resource strain: Not on file    Food insecurity:     Worry: Not on file     Inability: Not on file    Transportation needs:     Medical: Not on file     Non-medical: Not on file   Tobacco Use    Smoking status: Never Smoker    Smokeless tobacco: Never Used   Substance and Sexual Activity    Alcohol use: Not Currently    Drug use: Never    Sexual activity: Not on file   Lifestyle    Physical activity: Days per week: Not on file     Minutes per session: Not on file    Stress: Not on file   Relationships    Social connections:     Talks on phone: Not on file     Gets together: Not on file     Attends Hinduism service: Not on file     Active member of club or organization: Not on file     Attends meetings of clubs or organizations: Not on file     Relationship status: Not on file    Intimate partner violence:     Fear of current or ex partner: Not on file     Emotionally abused: Not on file     Physically abused: Not on file     Forced sexual activity: Not on file   Other Topics Concern    Not on file   Social History Narrative    Not on file       Allergies: Allergies   Allergen Reactions    Actoplus Met [Pioglitazone-Metformin] Not Reported This Time    Celestone [Betamethasone] Not Reported This Time    Clarithromycin Not Reported This Time    Codeine Other (comments)    Flagyl [Metronidazole] Not Reported This Time    Levaquin [Levofloxacin] Itching    Metformin Not Reported This Time    Other Medication Not Reported This Time     genifor  Mercaine    Prednisone Not Reported This Time    Prevacid [Lansoprazole] Not Reported This Time    Sulfa (Sulfonamide Antibiotics) Not Reported This Time       FamilyHx:  Family History   Problem Relation Age of Onset    Breast Cancer Sister     Ovarian Cancer Sister        Prior to Admission Medications   Prescriptions Last Dose Informant Patient Reported? Taking? CHOLECALCIFEROL, VITAMIN D3, (VITAMIN D3 PO)   Yes No   Sig: Take  by mouth. CYANOCOBALAMIN, VITAMIN B-12, (VITAMIN B-12 PO)   Yes No   Sig: Take  by mouth. amLODIPine (NORVASC) 5 mg tablet   No No   Sig: Take 1 Tab by mouth daily. Indications: HYPERTENSION   aspirin 81 mg chewable tablet   Yes No   Sig: Take 81 mg by mouth daily. ergocalciferol (VITAMIN D2) 50,000 unit capsule   Yes Yes   Sig: Take 50,000 Units by mouth every seven (7) days.    folic acid (FOLVITE) 1 mg tablet   Yes No Sig: Take 1 mg by mouth daily. furosemide (LASIX) 40 mg tablet   Yes Yes   Sig: Take 40 mg by mouth daily. insulin glargine (LANTUS SOLOSTAR U-100 INSULIN) 100 unit/mL (3 mL) inpn   Yes Yes   Sig: by SubCUTAneous route nightly. insulin lispro protamine/insulin lispro (HUMALOG MIX 75-25,U-100,INSULN) 100 unit/mL (75-25) injection   Yes No   Si Units by SubCUTAneous route two (2) times a day. lisinopril (PRINIVIL, ZESTRIL) 10 mg tablet   No No   Sig: Take 1 Tab by mouth daily. lisinopril (PRINIVIL, ZESTRIL) 40 mg tablet   Yes Yes   Sig: Take 40 mg by mouth daily. ondansetron (ZOFRAN ODT) 4 mg disintegrating tablet   No No   Sig: Take 1 tablets every 6-8 hours as needed for nausea and vomiting.       Facility-Administered Medications: None       Review of Systems:  CONST: Fever, weight loss, fatigue or chills, Confusion  HEENT: Recent changes in vision, vertigo, epistaxis, dysphagia and hoarseness  CV: Chest pain, palpitations, edema and varicosities  RESP: Cough, shortness of breath, wheezing, hemoptysis, snoring and reactive airway disease  GI: Nausea, vomiting, abdominal pain, change in bowel habits, hematochezia, melena, and GERD   : Hematuria, dysuria, frequency, urgency, nocturia and stress urinary incontinence   MS: Weakness, joint pain and arthritis  ENDO: Polyuria, polydipsia, polyphagia, poor wound healing, heat intolerance, cold intolerance  LYMPH/HEME: Anemia, bruising and history of blood transfusions  INTEG: Dermatitis, abnormal moles  NEURO: Dizziness, headache, fainting, seizures and stroke  PSYCH: Anxiety and depression      A comprehensive review of systems was negative except for that written in the History of Present Illness.     Objective      Visit Vitals  /65   Pulse 61   Temp 97.9 °F (36.6 °C)   Resp 18   SpO2 99%       Physical Exam:  General Appearance: NAD, conversant  HENT: normocephalic/atraumatic, moist mucus membranes  Lungs: CTA with normal respiratory effort  Cardiovascular: RRR, no m/r/g, capillary refill < 2 sec, B/L DP/PT pulses +3/4  Abdomen: soft, non distended, non-tender, active bowel sounds, no guarding or rigidity   Extremities: no cyanosis, no peripheral edema, good perfusion bilaterally   Neuro: moves all extremities with RUE limited overhead mobility due to shoulder replacement years ago, no focal deficits, nml tone, strength 5/5 upper lower, CN intact, no visual field deficits,  strength 5/5, no pronator drift, finger to nose intact,  Psych: appropriate affect, alert and oriented to person, place and time    Laboratory Studies: All lab results for the last 24 hours reviewed. Imaging Reviewed:  Ct Head Wo Cont    Result Date: 10/5/2019  EXAM: CT head CLINICAL INDICATION/HISTORY: Confusion and weakness. COMPARISON: 08/16/2016 TECHNIQUE: Axial CT imaging of the head was performed without intravenous contrast. One or more dose reduction techniques were used on this CT: automated exposure control, adjustment of the mAs and/or kVp according to patient size, and iterative reconstruction techniques. The specific techniques used on this CT exam have been documented in the patient's electronic medical record. Digital Imaging and Communications in Medicine (DICOM) format image data are available to nonaffiliated external healthcare facilities or entities on a secure, media free, reciprocally searchable basis with patient authorization for at least a 12-month period after this study. _______________ FINDINGS: BRAIN AND POSTERIOR FOSSA: There is no intracranial hemorrhage, mass effect, or midline shift. When compared to the 2016 exam, there is new hypoattenuation involving the left occipital lobe. Additionally, there is a focus of hypoattenuation in the left anterior issa which is new from prior imaging. There is a mild degree of sulcal enlargement and ventricular dilatation consistent with diffuse volume loss.  There is hypoattenuation of the periventricular white matter, which is nonspecific but most likely represents changes from chronic microangiopathy. Gray-white differentiation is maintained. EXTRA-AXIAL SPACES AND MENINGES: There are no abnormal extra-axial fluid collections. CALVARIUM: Intact. SINUSES: Clear. OTHER: None. _______________     IMPRESSION: 1. No intracranial hemorrhage, mass effect, or midline shift. 2. Age-indeterminate infarctions involving the left occipital lobe and left anterior issa. Given the degree of hypoattenuation, 3. Senescent changes of the brain including diffuse volume loss and findings most suggestive of chronic microvascular ischemia. Findings discussed with Dr. Jerry Rodriguez at 2:14 PM on 10/05/2019    Xr Chest Port    Result Date: 10/5/2019  EXAM: One view chest x-ray CLINICAL INDICATION/HISTORY: Weakness. COMPARISON: 06/07/2019. TECHNIQUE: Single AP view of the chest was obtained. _______________ FINDINGS: HEART, VESSELS, MEDIASTINUM: Heart size is normal. No vascular congestion. There is atherosclerosis of the thoracic aorta. LUNGS, PLEURAL SPACES: The lungs are clear. No effusion or pneumothorax. BONY THORAX, SOFT TISSUES: Unremarkable. _______________     IMPRESSION: No acute cardiopulmonary process.       EKG:   Normal sinus rhythm   Possible Lateral infarct (cited on or before 30-DEC-2013)   Abnormal ECG   When compared with ECG of 07-JUN-2019 16:22,   No significant change was found       Assessment/Plan     Active Hospital Problems    Diagnosis Date Noted    Hypokalemia 10/05/2019    Acute encephalopathy 10/05/2019    Diabetes mellitus type 2, controlled ([facility] [address] 10/05/2019    TIA (transient ischemic attack) 10/05/2019    Obesity 10/05/2019    UTI (urinary tract infection) 12/30/2013       Acute encephalopathy  -UTI vs CVA vs other    UTI  Abx, prescribe at discharge if needed  Adjust medication as culture grows and pending sensitivity   Follow culture  Ucx - Pending    CVA/TIA  NPO  CT head age intermediate infarct   MRI pending  MRA pending  ECHO  ASA/Statin  Permissive HTN 48 hours  PT/OT  Swallow eval  Neuro consult tomorrow    DM  -ACHS      - Cont acceptable home medications for chronic conditions   - DVT protocol    I have personally reviewed all pertinent labs, films and EKGs that have officially resulted. I reviewed available electronic documentation outlining the initial presentation as well as the emergency room physician's encounter.     Sam Dangelo PA-C  Internal Medicine, Hospitalist  Pager: [phone] [facility]

## 2019-10-06 ENCOUNTER — APPOINTMENT (OUTPATIENT)
Dept: NON INVASIVE DIAGNOSTICS | Age: 84
DRG: 064 | End: 2019-10-06
Attending: PHYSICIAN ASSISTANT
Payer: MEDICARE

## 2019-10-06 ENCOUNTER — APPOINTMENT (OUTPATIENT)
Dept: MRI IMAGING | Age: 84
DRG: 064 | End: 2019-10-06
Attending: PHYSICIAN ASSISTANT
Payer: MEDICARE

## 2019-10-06 LAB
ATRIAL RATE: 68 BPM
CALCULATED P AXIS, ECG09: 41 DEGREES
CALCULATED R AXIS, ECG10: -24 DEGREES
CALCULATED T AXIS, ECG11: 7 DEGREES
CHOLEST SERPL-MCNC: 164 MG/DL
DIAGNOSIS, 93000: NORMAL
ECHO AO ASC DIAM: 3.05 CM
ECHO AO ROOT DIAM: 3.03 CM
ECHO AV AREA PEAK VELOCITY: 1.4 CM2
ECHO AV AREA/BSA PEAK VELOCITY: 0.6 CM2/M2
ECHO AV PEAK GRADIENT: 13.2 MMHG
ECHO AV PEAK VELOCITY: 181.54 CM/S
ECHO AV REGURGITANT PHT: 421.2 CM
ECHO IVC SNIFF: 1.66 CM
ECHO LA MAJOR AXIS: 4.92 CM
ECHO LA TO AORTIC ROOT RATIO: 1.63
ECHO LV INTERNAL DIMENSION DIASTOLIC: 4.97 CM (ref 3.9–5.3)
ECHO LV INTERNAL DIMENSION SYSTOLIC: 4.38 CM
ECHO LV IVSD: 1.04 CM (ref 0.6–0.9)
ECHO LV MASS 2D: 194.4 G (ref 67–162)
ECHO LV MASS INDEX 2D: 93.8 G/M2 (ref 43–95)
ECHO LV POSTERIOR WALL DIASTOLIC: 0.85 CM (ref 0.6–0.9)
ECHO LVOT DIAM: 1.89 CM
ECHO LVOT PEAK GRADIENT: 3.2 MMHG
ECHO LVOT PEAK VELOCITY: 89.93 CM/S
ECHO LVOT SV: 67 ML
ECHO LVOT VTI: 23.84 CM
ECHO MV A VELOCITY: 136.43 CM/S
ECHO MV AREA PHT: 2.6 CM2
ECHO MV AREA VTI: 1.6 CM2
ECHO MV E DECELERATION TIME (DT): 339.5 MS
ECHO MV E VELOCITY: 87.66 CM/S
ECHO MV E/A RATIO: 0.64
ECHO MV MAX VELOCITY: 147.45 CM/S
ECHO MV MEAN GRADIENT: 3.3 MMHG
ECHO MV PEAK GRADIENT: 8.7 MMHG
ECHO MV PRESSURE HALF TIME (PHT): 84.3 MS
ECHO MV VTI: 42.11 CM
ECHO PULMONARY ARTERY SYSTOLIC PRESSURE (PASP): 28.8 MMHG
ECHO RA MINOR AXIS: 2.94 CM
ECHO TV REGURGITANT MAX VELOCITY: 254 CM/S
ECHO TV REGURGITANT PEAK GRADIENT: 25.8 MMHG
ERYTHROCYTE [DISTWIDTH] IN BLOOD BY AUTOMATED COUNT: 14.3 % (ref 11.6–14.5)
EST. AVERAGE GLUCOSE BLD GHB EST-MCNC: 189 MG/DL
GLUCOSE BLD STRIP.AUTO-MCNC: 119 MG/DL (ref 70–110)
GLUCOSE BLD STRIP.AUTO-MCNC: 235 MG/DL (ref 70–110)
GLUCOSE BLD STRIP.AUTO-MCNC: 287 MG/DL (ref 70–110)
GLUCOSE BLD STRIP.AUTO-MCNC: 350 MG/DL (ref 70–110)
HBA1C MFR BLD: 8.2 % (ref 4.2–5.6)
HCT VFR BLD AUTO: 37.2 % (ref 35–45)
HDLC SERPL-MCNC: 44 MG/DL (ref 40–60)
HDLC SERPL: 3.7 {RATIO} (ref 0–5)
HGB BLD-MCNC: 11.8 G/DL (ref 12–16)
LDLC SERPL CALC-MCNC: 84.6 MG/DL (ref 0–100)
LIPID PROFILE,FLP: ABNORMAL
MAGNESIUM SERPL-MCNC: 1.7 MG/DL (ref 1.6–2.6)
MCH RBC QN AUTO: 29.6 PG (ref 24–34)
MCHC RBC AUTO-ENTMCNC: 31.7 G/DL (ref 31–37)
MCV RBC AUTO: 93.2 FL (ref 74–97)
P-R INTERVAL, ECG05: 198 MS
PHOSPHATE SERPL-MCNC: 2.9 MG/DL (ref 2.5–4.9)
PISA AR MAX VEL: 371.99 CM/S
PLATELET # BLD AUTO: 204 K/UL (ref 135–420)
PMV BLD AUTO: 14.4 FL (ref 9.2–11.8)
POTASSIUM SERPL-SCNC: 4 MMOL/L (ref 3.5–5.5)
Q-T INTERVAL, ECG07: 404 MS
QRS DURATION, ECG06: 94 MS
QTC CALCULATION (BEZET), ECG08: 429 MS
RBC # BLD AUTO: 3.99 M/UL (ref 4.2–5.3)
TRIGL SERPL-MCNC: 177 MG/DL (ref ?–150)
TSH SERPL DL<=0.05 MIU/L-ACNC: 2.57 UIU/ML (ref 0.36–3.74)
VENTRICULAR RATE, ECG03: 68 BPM
VLDLC SERPL CALC-MCNC: 35.4 MG/DL
WBC # BLD AUTO: 13.3 K/UL (ref 4.6–13.2)

## 2019-10-06 PROCEDURE — 84443 ASSAY THYROID STIM HORMONE: CPT

## 2019-10-06 PROCEDURE — 83735 ASSAY OF MAGNESIUM: CPT

## 2019-10-06 PROCEDURE — 74011250636 HC RX REV CODE- 250/636: Performed by: PHYSICIAN ASSISTANT

## 2019-10-06 PROCEDURE — A9575 INJ GADOTERATE MEGLUMI 0.1ML: HCPCS | Performed by: INTERNAL MEDICINE

## 2019-10-06 PROCEDURE — 74011636637 HC RX REV CODE- 636/637: Performed by: PHYSICIAN ASSISTANT

## 2019-10-06 PROCEDURE — 84100 ASSAY OF PHOSPHORUS: CPT

## 2019-10-06 PROCEDURE — 36415 COLL VENOUS BLD VENIPUNCTURE: CPT

## 2019-10-06 PROCEDURE — 74011250637 HC RX REV CODE- 250/637: Performed by: PHYSICIAN ASSISTANT

## 2019-10-06 PROCEDURE — 74011250637 HC RX REV CODE- 250/637

## 2019-10-06 PROCEDURE — 84132 ASSAY OF SERUM POTASSIUM: CPT

## 2019-10-06 PROCEDURE — 83036 HEMOGLOBIN GLYCOSYLATED A1C: CPT

## 2019-10-06 PROCEDURE — 97116 GAIT TRAINING THERAPY: CPT

## 2019-10-06 PROCEDURE — 65270000029 HC RM PRIVATE

## 2019-10-06 PROCEDURE — 70551 MRI BRAIN STEM W/O DYE: CPT

## 2019-10-06 PROCEDURE — 97110 THERAPEUTIC EXERCISES: CPT

## 2019-10-06 PROCEDURE — 70544 MR ANGIOGRAPHY HEAD W/O DYE: CPT

## 2019-10-06 PROCEDURE — 74011250636 HC RX REV CODE- 250/636: Performed by: INTERNAL MEDICINE

## 2019-10-06 PROCEDURE — 74011636637 HC RX REV CODE- 636/637: Performed by: INTERNAL MEDICINE

## 2019-10-06 PROCEDURE — 85027 COMPLETE CBC AUTOMATED: CPT

## 2019-10-06 PROCEDURE — 80061 LIPID PANEL: CPT

## 2019-10-06 PROCEDURE — 97166 OT EVAL MOD COMPLEX 45 MIN: CPT

## 2019-10-06 PROCEDURE — 93306 TTE W/DOPPLER COMPLETE: CPT

## 2019-10-06 PROCEDURE — 97161 PT EVAL LOW COMPLEX 20 MIN: CPT

## 2019-10-06 PROCEDURE — 82962 GLUCOSE BLOOD TEST: CPT

## 2019-10-06 PROCEDURE — 77030021566 MRA NECK W WO CONT

## 2019-10-06 RX ORDER — GADOTERATE MEGLUMINE 376.9 MG/ML
20 INJECTION INTRAVENOUS
Status: COMPLETED | OUTPATIENT
Start: 2019-10-06 | End: 2019-10-06

## 2019-10-06 RX ORDER — GUAIFENESIN 100 MG/5ML
LIQUID (ML) ORAL
Status: COMPLETED
Start: 2019-10-06 | End: 2019-10-06

## 2019-10-06 RX ADMIN — INSULIN LISPRO 9 UNITS: 100 INJECTION, SOLUTION INTRAVENOUS; SUBCUTANEOUS at 17:48

## 2019-10-06 RX ADMIN — ATORVASTATIN CALCIUM 80 MG: 40 TABLET, FILM COATED ORAL at 22:47

## 2019-10-06 RX ADMIN — HEPARIN SODIUM 5000 UNITS: 5000 INJECTION INTRAVENOUS; SUBCUTANEOUS at 19:38

## 2019-10-06 RX ADMIN — Medication 81 MG: at 07:48

## 2019-10-06 RX ADMIN — ASPIRIN 81 MG 81 MG: 81 TABLET ORAL at 07:48

## 2019-10-06 RX ADMIN — GADOTERATE MEGLUMINE 20 ML: 376.9 INJECTION INTRAVENOUS at 13:25

## 2019-10-06 RX ADMIN — HEPARIN SODIUM 5000 UNITS: 5000 INJECTION INTRAVENOUS; SUBCUTANEOUS at 11:53

## 2019-10-06 RX ADMIN — INSULIN LISPRO 4 UNITS: 100 INJECTION, SOLUTION INTRAVENOUS; SUBCUTANEOUS at 07:49

## 2019-10-06 RX ADMIN — HEPARIN SODIUM 5000 UNITS: 5000 INJECTION INTRAVENOUS; SUBCUTANEOUS at 04:24

## 2019-10-06 RX ADMIN — ACETAMINOPHEN 650 MG: 325 TABLET, FILM COATED ORAL at 04:24

## 2019-10-06 RX ADMIN — INSULIN LISPRO 10 UNITS: 100 INJECTION, SOLUTION INTRAVENOUS; SUBCUTANEOUS at 13:55

## 2019-10-06 NOTE — PROGRESS NOTES
Assumed care of patient. Patient is AOx1, to person and place, resting in bed, in stable condition. Patient denies having any pain or discomfort. Dual skin assessment conducted with Chano Santamaria. Skin is intact with the exception of bruising on the left leg and callous on her feet. 1:  Patient's son wanted to know if the MD was going to make rounds. I told him I believe rounds were done earlier, but if you like I could contact the MD. Patient's son said that was okay and that he would try to catch the MD tomorrow morning about MRI results that were done earlier.

## 2019-10-06 NOTE — PROGRESS NOTES
1745: TRANSFER - IN REPORT:    Verbal report received from Natasha RN (name) on [address]  being received from ED(unit) for routine progression of care      Report consisted of patients Situation, Background, Assessment and   Recommendations(SBAR). Information from the following report(s) SBAR, Kardex, Procedure Summary, Intake/Output, MAR and Cardiac Rhythm SR was reviewed with the receiving nurse. Opportunity for questions and clarification was provided. Assessment completed upon patients arrival to unit and care assumed. 1830: Dual NIH performed with ED nurse. Noticed patient turning head to the left more to read. Vitals done and admission database. 1850: Had another RN look at patient regarding vision. They stated would score patient with partial hemianopia. Placed paged into PA to make aware. 1950: Bedside shift change report given to Iona HARE (oncoming nurse) by Fabiola Cedillo RN (offgoing nurse). Report included the following information SBAR, Kardex, Procedure Summary, Intake/Output, MAR and Cardiac Rhythm SR with 1st Degree and BBB. Dual skin check performed. Dual NIh Performed. Made Rn aware of partial hemianopia and my attempts to page PA about the symptoms.

## 2019-10-06 NOTE — ROUTINE PROCESS
0715- Bedside and Verbal shift change report given to Mackenzie Grady RN 
 (oncoming nurse) by Rose Marie Kirkpatrick RN (offgoing nurse). Report included the following information SBAR, Kardex and MAR.

## 2019-10-06 NOTE — PROGRESS NOTES
Problem: Mobility Impaired (Adult and Pediatric)  Goal: *Acute Goals and Plan of Care (Insert Text)  Description  Physical Therapy Goals  Initiated 10/6/2019 and to be accomplished within 7 day(s)  1. Patient will move from supine to sit and sit to supine , scoot up and down and roll side to side in bed with modified independence. 2.  Patient will transfer from bed to chair and chair to bed with modified independence using the least restrictive device. 3.  Patient will perform sit to stand with modified independence. 4.  Patient will ambulate with modified independence for >/= 100 feet with the least restrictive device. 5.  Patient will ascend/descend 4 stairs with handrail(s) with supervision/set-up. Outcome: Progressing Towards Goal     PHYSICAL THERAPY EVALUATION    Patient: Emilia Butterfield (89 y.o. female)  Date: 10/6/2019  Primary Diagnosis: TIA (transient ischemic attack) [G45.9]        Precautions:      PLOF: Lives with son and daughter. Per daughter, patient ambulatory with 3 wheeled walker    ASSESSMENT :  Based on the objective data described below, the patient presents with c/o bilateral knee pain with weight bearing, decrease safety awareness, impaired balance, decrease bed mobility, transfer and gait independence. Patient will benefit from skilled intervention to address the above impairments. Patient's rehabilitation potential is considered to be Good  Factors which may influence rehabilitation potential include:   ? None noted  ? Mental ability/status  ? Medical condition  ? Home/family situation and support systems  ? Safety awareness  ? Pain tolerance/management  ? Other:     Recommendations for nursing:  Written on communication board: OOB with assist of 1  Verbally communicated to: nurse Tonya Marcialms     PLAN :  Recommendations and Planned Interventions:  ?           Bed Mobility Training             ?     Neuromuscular Re-Education  ? Transfer Training                   ? Orthotic/Prosthetic Training  ? Gait Training                          ? Modalities  ? Therapeutic Exercises           ? Edema Management/Control  ? Therapeutic Activities            ? Family Training/Education  ? Patient Education  ? Other (comment):Plan of care, Role of PT in acute care setting, safety, bed mobility, transfer and gait with rolling walker    Frequency/Duration: Patient will be followed by physical therapy 1 time per day/3-5 days per week to address goals. Discharge Recommendations: Home Health  Further Equipment Recommendations for Discharge: N/A, has 3 wheeled walker at home     SUBJECTIVE:   Patient stated I need to pee. My knees hurts.     OBJECTIVE DATA SUMMARY:     Past Medical History:   Diagnosis Date    Diabetes ([facility] [address]     Hypertension      Past Surgical History:   Procedure Laterality Date    HX HYSTERECTOMY  age 62    HX ORTHOPAEDIC       Barriers to Learning/Limitations: yes;  cognitive  Compensate with: Verbal Cues and Tactile Cues  Home Situation:  Home Situation  Home Environment: Private residence  # Steps to Enter: 3  Rails to Enter: Yes  Hand Rails : Bilateral  Wheelchair Ramp: No  One/Two Story Residence: One story  Living Alone: No  Support Systems: Family member(s)  Patient Expects to be Discharged to[de-identified] Private residence  Current DME Used/Available at Home: Shower chair, Grab bars, Adaptive bathing aides, Commode, bedside  Tub or Shower Type: Tub/Shower combination  Critical Behavior:  Neurologic State: Alert  Orientation Level: Disoriented to time;Oriented to person;Oriented to place  Cognition: Follows commands;Poor safety awareness  Safety/Judgement: Fall prevention  Psychosocial  Patient Behaviors: Calm; Cooperative  Family  Behaviors: Calm;Supportive  Purposeful Interaction: Yes  Pt Identified Daily Priority: Communication issues (comment)  Gianni Process: Enable andrea/hope; Teaching/learning  Caring Interventions: Therapeutic modalities  Therapeutic Modalities: Intentional therapeutic touch  Family  Behaviors: Calm;Supportive      Strength:    Grossly decreased but WFL both LE      Tone & Sensation:   Normal tone both LE       Range Of Motion:  Grossly decreased but WFL both LE     Functional Mobility:  Bed Mobility:  Rolling: Modified independent  Supine to Sit: Modified independent  Sit to Supine: Contact guard assistance;Minimum assistance  Transfers:  Sit to Stand: Contact guard assistance; Additional time  Stand to Sit: Contact guard assistance; Additional time  Balance:   Sitting: Intact; Without support  Standing: Impaired; With support  Standing - Static: Fair  Standing - Dynamic : Fair  Ambulation/Gait Training:  Distance (ft): 8 Feet (ft)(+ 8 feet)  Ambulation - Level of Assistance: Contact guard assistance  Gait Abnormalities: Antalgic;Decreased step clearance  Base of Support: Center of gravity altered  Speed/Jocelyn: Pace decreased (<100 feet/min)  Step Length: Left shortened;Right shortened    Pain:  Pain level pre-treatment: 0/10   Pain level post-treatment: 0/10     Activity Tolerance:  Fair    Please refer to the flowsheet for vital signs taken during this treatment. After treatment:   ?         Patient left in no apparent distress sitting up in chair  ? Patient left in no apparent distress in bed  ? Call bell left within reach  ? Nursing notified  ? Caregiver present  ? Bed alarm activated  ? SCDs applied    COMMUNICATION/EDUCATION:   ?         Role of Physical Therapy in the acute care setting. ?         Fall prevention education was provided and the patient/caregiver indicated understanding. ? Patient/family have participated as able in goal setting and plan of care. ?         Patient/family agree to work toward stated goals and plan of care.   ?         Patient understands intent and goals of therapy, but is neutral about his/her participation. ? Patient is unable to participate in goal setting/plan of care: ongoing with therapy staff. ?         Other:     Thank you for this referral.  Lux Delgado, PT   Time Calculation: 33 mins      Eval Complexity: History: MEDIUM  Complexity : 1-2 comorbidities / personal factors will impact the outcome/ POC Exam:MEDIUM Complexity : 3 Standardized tests and measures addressing body structure, function, activity limitation and / or participation in recreation  Presentation: LOW Complexity : Stable, uncomplicated  Clinical Decision Making:Medium Complexity    Overall Complexity:LOW

## 2019-10-06 NOTE — ROUTINE PROCESS
2000: Assumed care. AAOX3. Disoriented to time. HOB elevated. No SOB on RA. Denies any pain or discomfort at this time. Call light within reach. 2041: Due med given. 2150: Due med given. 2250: Dysphagia screen done. No difficulty swallowing solids or liquid. Due med given. 2354: Due med given. No change from previous assessment. 0200: Sleeping. 
 
0400: No change from previous assessment. 0424: Due med given. Medicated for pain per patient request.  
 
0600: Slept on & off thru night. Needs attended. 0720: Bedside and Verbal shift change report given to Doctor Jelena Seals (oncoming nurse) by me (offgoing nurse). Report included the following information SBAR, Kardex, Intake/Output, MAR and Recent Results.

## 2019-10-06 NOTE — MANAGEMENT PLAN
Discharge/Transition Planning    Problem: Discharge Planning  Goal: *Discharge to safe environment  Outcome: Progressing Towards Goal     Reason for Admission:   Stroke                  RRAT Score:     17             Do you (patient/family) have any concerns for transition/discharge? Home Health. Getting aid for bath. Notified would need to be getting PT and OT at home and insurance would desi aid as long as other service. Otherwise they can call the [facility] and see if any benefit or self pay              Plan for utilizing home health:   yes    Current Advanced Directive/Advance Care Plan: On File and DNR            Transition of Care Plan:        Dufm Marisa family in room as pt seems confused and did not answer questions much. Pt with Medicare and [employer]. [facility]ses [facility]. Lives in 1 story house with 3-4 steps and children live with and care for her. Needs assist with ADL. DME: 3-wheel rollator walker; BSC; wheelchair. Unable to get in bath due to tub. Paln : Home and HH , they do not want SNF      Patient has designated __son and daughter______________________ to participate in his/her discharge plan and to receive any needed information.    KarenFranci Child   [phone]   Coit Nino Child [phone]       Care Management Interventions  PCP Verified by CM: Yes(Dr Yuly Frazier with [facility])  Mode of Transport at Discharge: BLS  Transition of Care Consult (CM Consult): Discharge Planning  Physical Therapy Consult: Yes  Occupational Therapy Consult: Yes  Current Support Network: Relative's Home  Confirm Follow Up Transport: Family  Plan discussed with Pt/Family/Caregiver: Yes  Discharge Location  Discharge Placement: Home with home health

## 2019-10-06 NOTE — PROGRESS NOTES
Internal Medicine Progress Note        NAME: Merline Kapoor   :  1923  MRM:  [id number]    Date/Time: 10/6/2019        ASSESSMENT/PLAN:    Acute encephalopathy  -UTI vs CVA vs other. Neurology consulted. Pending urine culture.      UTI  Abx, Adjust medication as culture grows and pending sensitivity   Ucx - Pending     CVA/TIA   Admit to telemetry bed  . PT/OT/SLP/CM Consult. ASA. Statin . FLP.       - Neurology consulted. CT head age intermediate infarct   MRI pending  MRA pending  ECHO   Permissive HTN 48 hours         DM  Provide SSI, hypoglycemia protocol and frequent Accu checks. Provide SSI, hypoglycemia protocol and frequent Accu checks. Education,  diabetic educator     HTN. Son Report in good control     Obesity . Body mass index is 38.11 kg/m².      -Code status : FULL    Lab Review:     Recent Labs     10/06/19  0434 10/05/19  1330   WBC 13.3* 14.9*   HGB 11.8* 12.6   HCT 37.2 39.2    217     Recent Labs     10/06/19  0434 10/05/19  1330   NA  --  137   K  --  3.2*   CL  --  99*   CO2  --  30   GLU  --  82   BUN  --  23*   CREA  --  1.32*   CA  --  9.2   MG 1.7 1.7   PHOS 2.9  --    ALB  --  3.1*   TBILI  --  0.4   SGOT  --  15   ALT  --  15     Lab Results   Component Value Date/Time    Glucose (POC) 235 (H) 10/06/2019 07:35 AM    Glucose (POC) 181 (H) 10/05/2019 10:41 PM    Glucose (POC) 227 (H) 2014 11:10 AM    Glucose (POC) 134 (H) 2014 07:28 AM    Glucose (POC) 111 (H) 2014 09:34 PM     No results for input(s): PH, PCO2, PO2, HCO3, FIO2 in the last 72 hours. No results for input(s): INR, INREXT in the last 72 hours.     Lab Results   Component Value Date/Time    Specimen Description: BLOOD 2014 07:00 PM    Specimen Description: BLOOD 2014 06:00 PM    Specimen Description: CATH URINE 2013 12:44 PM     Lab Results   Component Value Date/Time    Culture result: >100,000  COLONIES/mL  KLEBSIELLA PNEUMONIAE   2016 04:15 AM    Culture result:  08/16/2016 04:15 AM     >100,000  COLONIES/mL  KLEBSIELLA PNEUMONIAE  2ND MORPHOTYPE      Culture result: NO GROWTH 6 DAYS 01/04/2014 07:00 PM       All Cardiac Markers in the last 24 hours:   Lab Results   Component Value Date/Time    TROIQ <0.02 10/05/2019 01:30 PM              Subjective:     Chief Complaint:      Offer no complain. Seem confused in hospital envi. Say everything is weird here     ROS:  (bold if positive,otherwise negative)    Fever/chills ,  Dysuria   Cough , Sputum , SOB/LE , Chest Pain     Diarrhea ,Nausea/Vomit , Abd Pain , Constipation    Tolerating Diet                Objective:     Vitals:  Last 24hrs VS reviewed since prior progress note. Most recent are:    Visit Vitals  /58 (BP 1 Location: Right arm, BP Patient Position: At rest;Supine; Head of bed elevated (Comment degrees))   Pulse 89   Temp 97.4 °F (36.3 °C)   Resp 18   Wt 103.9 kg (229 lb)   SpO2 98%   BMI 38.11 kg/m²     SpO2 Readings from Last 6 Encounters:   10/06/19 98%   06/07/19 98%   04/21/19 99%   04/26/18 99%   08/16/16 94%   01/06/14 97%            Intake/Output Summary (Last 24 hours) at 10/6/2019 0741  Last data filed at 10/6/2019 0424  Gross per 24 hour   Intake 760 ml   Output    Net 760 ml          Physical Exam:   Gen: Well-developed, well-nourished, in no acute distress  HEENT: Head atraumatic, normocephalic ,  hearing intact to voice, moist mucous membranes  Neck: No apparent JVD, Supple,   thyroid non-tender  Resp: No accessory muscle use, Bilateral BS present,clear breath sounds without wheezes rales or rhonchi  Card:   normal S1, S2 without thrills, bruits or Gallop. No significant lower leg peripheral edema.   Abd:  Soft, non-tender, not distended, normoactive bowel sounds are present   Musc: No cyanosis or clubbing  Skin: No rashes or ulcers, skin turgor is good   Neuro:  Cranial nerves are grossly intact, no clear area of focal motor weakness, follows commands appropriately   alert Medications Reviewed: (see below)    Lab Data Reviewed: (see below)    ______________________________________________________________________    Medications:     Current Facility-Administered Medications   Medication Dose Route Frequency    aspirin chewable tablet 81 mg  81 mg Oral DAILY    aspirin chewable tablet 81 mg  81 mg Oral DAILY    atorvastatin (LIPITOR) tablet 80 mg  80 mg Oral QHS    acetaminophen (TYLENOL) tablet 650 mg  650 mg Oral Q4H PRN    labetalol (NORMODYNE;TRANDATE) injection 5 mg  5 mg IntraVENous Q10MIN PRN    insulin lispro (HUMALOG) injection   SubCUTAneous AC&HS    glucose chewable tablet 16 g  4 Tab Oral PRN    glucagon (GLUCAGEN) injection 1 mg  1 mg IntraMUSCular PRN    heparin (porcine) injection 5,000 Units  5,000 Units SubCUTAneous Q8H          Total time spent with patient: 35 minutes                  Care Plan discussed with: Patient, Family, Care Manager and Nursing Staff    Discussed:  Care Plan    Prophylaxis:  Hep SQ    Disposition:  Home w/Family             Attending Physician: Vimal Valdez MD

## 2019-10-06 NOTE — PHYSICIAN ADVISORY
[facility] Physician Advisor Recommendation The information in this document is a recommendation to be used for utilization review and utilization management purposes only. This recommendation is not an order. The recommendation is made based on the information reviewed at the time of the referral, is pursuant to the [facility] Conditions of Participation (42 CFR Part 482), and is neither a judgment nor an assessment with regard to the appropriateness or quality of clinical care. Nothing in this document may be used to limit, alter, or affect clinical services provided to the patient named below. The provider of services is ultimately responsible for the submission of a claim that has met all requirements for correct coding, billing, and reimbursement. Letter of Status Determination: Current Status INPATIENT is Appropriate Pt Name:  Ivon Lees MR#  [id number] [facility]#   [id number] [facility]  3008/01  @ [address] Hospitalization date  10/5/2019 12:58 PM  
Current Attending Physician  Barbara Huertas MD  
Principal diagnosis  Acute encephalopathy Clinicals  80 y.o. female hospitalized with as above. 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 of indeterminate age and  time of onset UTI Patient will need MRI of Brain and MRA of head and neck to better ascertain age of stroke especially since she has poor insight into the loss of her visual field  mg ad unless clear evidence of thromboembolic etiology or evident left PCA arterial stenosis [place] MCG criteria Does  NOT apply STATUS DETERMINATION  On the basis of clinical data, available documentation, we believe that the current status of this patient as INPATIENT is Appropriate Additional comments Insurance  Payor: [place] MED[place]ARE / Plan: [place] MEDICARE PART A & B / Product Type: Medicare /   
 
  
11:54 AM 10/6/2019 Junior Singh M.D., [facility]  
 Physician [address] [facility] [id number] [place] [address], [address] [place] [address] C: [phone]

## 2019-10-06 NOTE — PROGRESS NOTES
Speech Therapy Note:    On-call SLP acknowledging eval & tx orders. After chart review, pt passed swallow screen last evening without incident. Pt has been placed on regular diet. SLP will follow up next am as indicated to determine if formal evaluation indicated. Melissa Becerra M.S., [facility]  Ph: [phone]

## 2019-10-06 NOTE — PROGRESS NOTES
Problem: Self Care Deficits Care Plan (Adult)  Goal: *Acute Goals and Plan of Care (Insert Text)  Description  Occupational Therapy Goals  Initiated 10/6/2019 within 7 day(s). 1.  Patient will perform oral hygiene while standing at sink with MIN A for dynamic balance. 2.  Patient will perform upper body dressing with modified independence. 3.  Patient will perform toilet transfers with modified independence using BSC. 4.  Patient will perform all aspects of toileting with supervision/set-up. 5.  Patient will participate in upper extremity therapeutic exercise/activities with independence for 7 minutes. 7.  Patient will utilize energy conservation techniques during functional activities with verbal cues. 10/6/2019 1100 by Lina Chacon OT  Outcome: Progressing Towards Goal   OCCUPATIONAL THERAPY EVALUATION    Patient: Miguelito Diaz (25 y.o. female)  Date: 10/6/2019  Primary Diagnosis: TIA (transient ischemic attack) [G45.9]        Precautions: Fall, Skin  PLOF: Pt was living with son in a single story residence. Was completing most BADLs with modified independence. Has not left house since May. Spends the day sitting in a recliner and will get up to urinate periodically. ASSESSMENT :  Based on the objective data described below, the patient presents with impairment with regard to strength and endurance impacting ability to complete BADLs. Pt received in bed with caregivers present and is agreeable to therapy. Pt able to answer questions relating to level of function with support from caregivers. With bed positioned flat, pt is able to transition supine to sit using bed rails for support and MIN A for LE management. At EOB, pt completes ROM screen with limitations in shoulder for overhead reaching; is able to access hair for hygiene using LUE. Completes sit to stand with CS using rolling walker for balance and is able to take ~3 steps toward bathroom.  Pt beginning to complain of fatigue and is encouraged to return to bed; unable to take backwards steps demos decreased ability to manage walker and using wall for support. Pt returning to sitting EOB and transitions to supine with MIN A for LE management to bring to bed. Once in bed, pt is able to scoot and position self. Pt with increased respiratory rate following OOB activity. Patient will benefit from skilled intervention to address the above impairments. Patient's rehabilitation potential is considered to be Good  Factors which may influence rehabilitation potential include:   ? None noted  ? Mental ability/status  ? Medical condition  ? Home/family situation and support systems  ? Safety awareness  ? Pain tolerance/management  ? Other:      PLAN :  Recommendations and Planned Interventions:   ?               Self Care Training                  ? Therapeutic Activities  ? Functional Mobility Training   ? Cognitive Retraining  ? Therapeutic Exercises           ? Endurance Activities  ? Balance Training                    ? Neuromuscular Re-Education  ? Visual/Perceptual Training     ? Home Safety Training  ? Patient Education                   ? Family Training/Education  ? Other (comment):    Frequency/Duration: Patient will be followed by occupational therapy 3-5 times a week to address goals. Discharge Recommendations: Home Health  Further Equipment Recommendations for Discharge: transfer bench      SUBJECTIVE:   Patient stated I can't do it.     OBJECTIVE DATA SUMMARY:     Past Medical History:   Diagnosis Date    Diabetes ([facility] [address]     Hypertension      Past Surgical History:   Procedure Laterality Date    HX HYSTERECTOMY  age 62    HX ORTHOPAEDIC       Barriers to Learning/Limitations: yes;  cognitive  Compensate with: visual, verbal, tactile, kinesthetic cues/model    Home Situation:   Home Situation  Home Environment: Private residence  # Steps to Enter: 3  Rails to Enter: Yes  Hand Rails : Bilateral  Wheelchair Ramp: No  One/Two Story Residence: One story  Living Alone: No  Support Systems: Family member(s)  Patient Expects to be Discharged to[de-identified] Private residence  Current DME Used/Available at Home: Shower chair, Grab bars, Adaptive bathing aides, Commode, bedside  Tub or Shower Type: Tub/Shower combination  ? Right hand dominant   ? Left hand dominant    Cognitive/Behavioral Status:  Neurologic State: Alert     Cognition: Follows commands;Poor safety awareness  Safety/Judgement: Fall prevention    Skin: intact  Edema: none noted    Vision/Perceptual:    Corrective Lenses: Glasses  Pt wears glasses. Per pt chart, pt with partial hemianopia on R    Coordination: BUE  Fine Motor Skills-Upper: Right Intact; Left Intact   Pt is able to complete sequential thumb to finger isolation bilaterally    Gross Motor Skills-Upper: Right Impaired;Left Intact  Greater ROM on L compared to R; BUE limited in overhead movement    Balance:  Sitting: Intact; Without support  Standing: Impaired; With support  Standing - Static: Fair  Standing - Dynamic : Fair    Strength: BUE  3/5 proximally, 4/5 distally    Tone & Sensation: BUE  No tone noted in BUE; sensation to light touch intact    Range of Motion: BUE  Limited mobility through bilateral shoulders, not impacting ability to complete BADLs at home. Pt s/p rotator cuff repair and chronic bursitis in R shoulder, per caregiver    Functional Mobility and Transfers for ADLs:  Bed Mobility:  Rolling: Modified independent  Supine to Sit: Modified independent  Sit to Supine: Minimum assistance     Transfers:  Sit to Stand:  Additional time;Contact guard assistance  Stand to Sit: Additional time;Contact guard assistance   Bathroom Mobility: Minimum assistance    ADL Assessment:   Feeding: Setup  Oral Facial Hygiene/Grooming: Moderate assistance  Bathing: Moderate assistance  Upper Body Dressing: Moderate assistance  Lower Body Dressing: Maximum assistance  Toileting: Moderate assistance      Cognitive Retraining  Safety/Judgement: Fall prevention    Therapeutic Exercise:  Pt is able to complete bed mobility and stand for 2 minutes. Complains of mild dizziness in transition from supine to sitting EOB which resolves within 1 min. Pt beginning to ambulate to bathroom, but unable to complete secondary to fatigue. Pain:  Pain level pre-treatment: 0/10   Pain level post-treatment: 2/10   Pain Intervention(s): Rest  Response to intervention: Nurse notified    Activity Tolerance:   Fair     Please refer to the flowsheet for vital signs taken during this treatment. After treatment:   ? Patient left in no apparent distress sitting up in chair  ? Patient left in no apparent distress in bed  ? Call bell left within reach  ? Nursing notified  ? Caregiver present  ? Bed alarm activated    COMMUNICATION/EDUCATION:   ? Role of Occupational Therapy in the acute care setting  ? Home safety education was provided and the patient/caregiver indicated understanding. ? Patient/family have participated as able in goal setting and plan of care. ? Patient/family agree to work toward stated goals and plan of care. ? Patient understands intent and goals of therapy, but is neutral about his/her participation. ? Patient is unable to participate in goal setting and plan of care. Thank you for this referral.  Helene Bradshaw OT  Time Calculation: 35 mins    Eval Complexity: History: MEDIUM Complexity : Expanded review of history including physical, cognitive and psychosocial  history ; Examination: MEDIUM Complexity : 3-5 performance deficits relating to physical, cognitive , or psychosocial skils that result in activity limitations and / or participation restrictions;    Decision Making:MEDIUM Complexity : Patient may present with comorbidities that affect occupational performnce.  Miniml to moderate modification of tasks or assistance (eg, physical or verbal ) with assesment(s) is necessary to enable patient to complete evaluation

## 2019-10-06 NOTE — PROGRESS NOTES
Problem: Pressure Injury - Risk of  Goal: *Prevention of pressure injury  Description  Document Nomi Scale and appropriate interventions in the flowsheet. Outcome: Progressing Towards Goal  Note:   Pressure Injury Interventions:       Moisture Interventions: Absorbent underpads, Internal/External urinary devices    Activity Interventions: Pressure redistribution bed/mattress(bed type), PT/OT evaluation    Mobility Interventions: HOB 30 degrees or less, Pressure redistribution bed/mattress (bed type), PT/OT evaluation    Nutrition Interventions: Document food/fluid/supplement intake, Offer support with meals,snacks and hydration    Friction and Shear Interventions: Foam dressings/transparent film/skin sealants, HOB 30 degrees or less, Minimize layers                Problem: Patient Education: Go to Patient Education Activity  Goal: Patient/Family Education  Outcome: Progressing Towards Goal     Problem: Falls - Risk of  Goal: *Absence of Falls  Description  Document Flip Fall Risk and appropriate interventions in the flowsheet. Outcome: Progressing Towards Goal  Note:   Fall Risk Interventions:  Mobility Interventions: Communicate number of staff needed for ambulation/transfer, Patient to call before getting OOB, PT Consult for mobility concerns    Mentation Interventions: Adequate sleep, hydration, pain control, Door open when patient unattended, Reorient patient, Room close to nurse's station    Medication Interventions: Patient to call before getting OOB    Elimination Interventions: Call light in reach, Patient to call for help with toileting needs              Problem: Patient Education: Go to Patient Education Activity  Goal: Patient/Family Education  Outcome: Progressing Towards Goal     Problem: Diabetes Self-Management  Goal: *Disease process and treatment process  Description  Define diabetes and identify own type of diabetes; list 3 options for treating diabetes.   Outcome: Progressing Towards Goal  Goal: *Monitoring blood glucose, interpreting and using results  Description  Identify recommended blood glucose targets  and personal targets.   Outcome: Progressing Towards Goal     Problem: Patient Education: Go to Patient Education Activity  Goal: Patient/Family Education  Outcome: Progressing Towards Goal     Problem: TIA/CVA Stroke: 0-24 hours  Goal: Activity/Safety  Outcome: Progressing Towards Goal  Goal: Consults, if ordered  Outcome: Progressing Towards Goal  Goal: Diagnostic Test/Procedures  Outcome: Progressing Towards Goal  Goal: Nutrition/Diet  Outcome: Progressing Towards Goal  Goal: Discharge Planning  Outcome: Progressing Towards Goal  Goal: Medications  Outcome: Progressing Towards Goal  Goal: Respiratory  Outcome: Progressing Towards Goal  Goal: Treatments/Interventions/Procedures  Outcome: Progressing Towards Goal  Goal: Minimize risk of bleeding post-thrombolytic infusion  Outcome: Progressing Towards Goal  Goal: Monitor for complications post-thrombolytic infusion  Outcome: Progressing Towards Goal  Goal: Psychosocial  Outcome: Progressing Towards Goal  Goal: *Hemodynamically stable  Outcome: Progressing Towards Goal  Goal: *Neurologically stable  Description  Absence of additional neurological deficits    Outcome: Progressing Towards Goal  Goal: *Verbalizes anxiety and depression are reduced or absent  Outcome: Progressing Towards Goal  Goal: *Absence of Signs of Aspiration on Current Diet  Outcome: Progressing Towards Goal  Goal: *Absence of deep venous thrombosis signs and symptoms(Stroke Metric)  Outcome: Progressing Towards Goal  Goal: *Ability to perform ADLs and demonstrates progressive mobility and function  Outcome: Progressing Towards Goal  Goal: *Stroke education started(Stroke Metric)  Outcome: Progressing Towards Goal  Goal: *Dysphagia screen performed(Stroke Metric)  Outcome: Progressing Towards Goal  Goal: *Rehab consulted(Stroke Metric)  Outcome: Progressing Towards Goal Problem: Urinary Tract Infection - Adult  Goal: *Absence of infection signs and symptoms  Outcome: Progressing Towards Goal     Problem: Patient Education: Go to Patient Education Activity  Goal: Patient/Family Education  Outcome: Progressing Towards Goal

## 2019-10-06 NOTE — CONSULTS
Teleneurology Consult    Patient ID  Name:  Akanksha Archuleta  :  1923  MRN:  [id number]  Age:  80 y.o. PCP:  Xavi Ruvalcaba MD    Subjective:     Encounter Date:  10/5/2019    Referring Physician: No ref. provider found    Chief Complaint   Patient presents with    Fatigue    Altered mental status       History of Present Illness:   Charlene Stacy is a 80 y.o. RH female with DM, seen on telemedicine utilizing a telepresenter. Reportedly she became confused over past 3 days but no lateralizing symptoms or weakness. After arrival to ED, she was found to have a UTI and head CT revelaed indeterminate left occipital stroke and possible mild left pontine stroke.      Current Facility-Administered Medications   Medication Dose Route Frequency Provider Last Rate Last Dose    aspirin chewable tablet 81 mg  81 mg Oral DAILY Michaela Rubio PA-C   81 mg at 10/06/19 0748    atorvastatin (LIPITOR) tablet 80 mg  80 mg Oral QHS Michaela Rubio PA-C   80 mg at 10/05/19 2251    acetaminophen (TYLENOL) tablet 650 mg  650 mg Oral Q4H PRN Michaela Rubio PA-C   650 mg at 10/06/19 0424    labetalol (NORMODYNE;TRANDATE) injection 5 mg  5 mg IntraVENous Q10MIN PRN Tammy JOSHI PA-C        insulin lispro (HUMALOG) injection   SubCUTAneous AC&HS Michaela Rubio PA-C   4 Units at 10/06/19 6726    glucose chewable tablet 16 g  4 Tab Oral PRN Tammy JOSHI PA-C        glucagon (GLUCAGEN) injection 1 mg  1 mg IntraMUSCular PRN Tammy JOSHI PA-C        heparin (porcine) injection 5,000 Units  5,000 Units SubCUTAneous Q8H Michaela Rubio PA-C   5,000 Units at 10/06/19 0424     Allergies   Allergen Reactions    Actoplus Met [Pioglitazone-Metformin] Not Reported This Time    Celestone [Betamethasone] Not Reported This Time    Clarithromycin Not Reported This Time    Codeine Other (comments)    Flagyl [Metronidazole] Not Reported This Time    Levaquin [Levofloxacin] Itching    Metformin Not Reported This Time    Other Medication Not Reported This Time     genifor  Mercaine    Prednisone Not Reported This Time    Prevacid [Lansoprazole] Not Reported This Time    Sulfa (Sulfonamide Antibiotics) Not Reported This Time     Patient Active Problem List   Diagnosis Code    UTI (urinary tract infection) N39.0    Hypokalemia E87.6    Acute encephalopathy G93.40    Diabetes mellitus type 2, controlled ([facility] [address] E11.9    TIA (transient ischemic attack) G45.9    Obesity E66.9     Past Medical History:   Diagnosis Date    Diabetes ([facility] [address]     Hypertension       Past Surgical History:   Procedure Laterality Date    HX HYSTERECTOMY  age 62   [de-identified] ORTHOPAEDIC        Family History   Problem Relation Age of Onset    Breast Cancer Sister     Ovarian Cancer Sister       Social History     Socioeconomic History    Marital status:      Spouse name: Not on file    Number of children: Not on file    Years of education: Not on file    Highest education level: Not on file   Tobacco Use    Smoking status: Never Smoker    Smokeless tobacco: Never Used   Substance and Sexual Activity    Alcohol use: Not Currently    Drug use: Never       Review of Systems:  Pertinent items are noted in HPI. Objective:     Vitals:    10/05/19 2045 10/06/19 0000 10/06/19 0400 10/06/19 0746   BP: 148/51 152/59 151/58 143/59   Pulse: 76 75 89 70   Resp: 18 18 18 18   Temp: 97.3 °F (36.3 °C) 97.7 °F (36.5 °C) 97.4 °F (36.3 °C) 97.9 °F (36.6 °C)   SpO2: 97% 98% 98% 99%   Weight:           Physical Exam:    General:  Patient seen via telemedicine video encounter utilizing a tele-presenter. No acute distress. Neck: Supple, nontender, thyroid within normal limits, no JVD, no bruits, no pain with resistance to active range of motion per telepresenter  Heart: Regular rate and rhythm, no murmurs, rub, or gallop.   Normal S1S2 per telepresenter  Lungs:  Clear to auscultation bilaterally with equal chest expansion, no cough, no wheeze per telepresenter  Musculoskeletal: No obvious impairment on gross testing    NEUROLOGICAL EXAMINATION:     Mental Status:   Alert and oriented to person, place, and time with recent and remote memory intact. Attention span and concentration are normal.   Speech is fluent with a full fund of knowledge. Cranial Nerves:    II, III, IV, VI:  Visual fields are normal to confrontation on the left but impaired on the right    Pupils are equal, round, and reactive to light and accommodation. Extra-ocular movements are full and fluid. No ptosis or nystagmus. V-XII:   Face is symmetric  Normal sensation. The palate rises symmetrically and the tongue protrudes midline. Sternocleidomastoids 5/5.       Motor Examination: Strength 5/5 but difficulty raising right shoulder  No involuntary movements, Normal tone    Sensory exam:  Normal throughout to light touch per telepresenter  Coordination:  No dysmetria, No resting or intention tremor    Gait and Station:  Not tested  Reflexes:  Not tested by telepresenter  Labs  Recent Results (from the past 24 hour(s))   EKG, 12 LEAD, INITIAL    Collection Time: 10/05/19  1:16 PM   Result Value Ref Range    Ventricular Rate 68 BPM    Atrial Rate 68 BPM    P-R Interval 198 ms    QRS Duration 94 ms    Q-T Interval 404 ms    QTC Calculation (Bezet) 429 ms    Calculated P Axis 41 degrees    Calculated R Axis -24 degrees    Calculated T Axis 7 degrees    Diagnosis       Normal sinus rhythm  Possible Lateral infarct (cited on or before 30-DEC-2013)  Abnormal ECG  When compared with ECG of 07-JUN-2019 16:22,  No significant change was found     CBC WITH AUTOMATED DIFF    Collection Time: 10/05/19  1:30 PM   Result Value Ref Range    WBC 14.9 (H) 4.6 - 13.2 K/uL    RBC 4.21 4.20 - 5.30 M/uL    HGB 12.6 12.0 - 16.0 g/dL    HCT 39.2 35.0 - 45.0 %    MCV 93.1 74.0 - 97.0 FL    MCH 29.9 24.0 - 34.0 PG    MCHC 32.1 31.0 - 37.0 g/dL    RDW 14.3 11.6 - 14.5 % PLATELET 301 028 - 068 K/uL    MPV 14.1 (H) 9.2 - 11.8 FL    NEUTROPHILS 68 40 - 73 %    LYMPHOCYTES 25 21 - 52 %    MONOCYTES 6 3 - 10 %    EOSINOPHILS 1 0 - 5 %    BASOPHILS 0 0 - 2 %    ABS. NEUTROPHILS 10.1 (H) 1.8 - 8.0 K/UL    ABS. LYMPHOCYTES 3.7 (H) 0.9 - 3.6 K/UL    ABS. MONOCYTES 0.9 0.05 - 1.2 K/UL    ABS. EOSINOPHILS 0.2 0.0 - 0.4 K/UL    ABS. BASOPHILS 0.0 0.0 - 0.1 K/UL    DF AUTOMATED     METABOLIC PANEL, COMPREHENSIVE    Collection Time: 10/05/19  1:30 PM   Result Value Ref Range    Sodium 137 136 - 145 mmol/L    Potassium 3.2 (L) 3.5 - 5.5 mmol/L    Chloride 99 (L) 100 - 111 mmol/L    CO2 30 21 - 32 mmol/L    Anion gap 8 3.0 - 18 mmol/L    Glucose 82 74 - 99 mg/dL    BUN 23 (H) 7.0 - 18 MG/DL    Creatinine 1.32 (H) 0.6 - 1.3 MG/DL    BUN/Creatinine ratio 17 12 - 20      GFR est AA 45 (L) >60 ml/min/1.73m2    GFR est non-AA 37 (L) >60 ml/min/1.73m2    Calcium 9.2 8.5 - 10.1 MG/DL    Bilirubin, total 0.4 0.2 - 1.0 MG/DL    ALT (SGPT) 15 13 - 56 U/L    AST (SGOT) 15 10 - 38 U/L    Alk.  phosphatase 94 45 - 117 U/L    Protein, total 8.1 6.4 - 8.2 g/dL    Albumin 3.1 (L) 3.4 - 5.0 g/dL    Globulin 5.0 (H) 2.0 - 4.0 g/dL    A-G Ratio 0.6 (L) 0.8 - 1.7     TROPONIN I    Collection Time: 10/05/19  1:30 PM   Result Value Ref Range    Troponin-I, QT <0.02 0.0 - 0.045 NG/ML   LIPASE    Collection Time: 10/05/19  1:30 PM   Result Value Ref Range    Lipase 85 73 - 393 U/L   MAGNESIUM    Collection Time: 10/05/19  1:30 PM   Result Value Ref Range    Magnesium 1.7 1.6 - 2.6 mg/dL   URINALYSIS W/ RFLX MICROSCOPIC    Collection Time: 10/05/19  2:06 PM   Result Value Ref Range    Color YELLOW      Appearance CLOUDY      Specific gravity 1.011 1.005 - 1.030      pH (UA) 6.0 5.0 - 8.0      Protein NEGATIVE  NEG mg/dL    Glucose NEGATIVE  NEG mg/dL    Ketone NEGATIVE  NEG mg/dL    Bilirubin NEGATIVE  NEG      Blood NEGATIVE  NEG      Urobilinogen 0.2 0.2 - 1.0 EU/dL    Nitrites NEGATIVE  NEG      Leukocyte Esterase LARGE (A) NEG     URINE MICROSCOPIC ONLY    Collection Time: 10/05/19  2:06 PM   Result Value Ref Range    WBC 4 to 10 0 - 4 /hpf    RBC 0 to 1 0 - 5 /hpf    Epithelial cells 1+ 0 - 5 /lpf    Bacteria 3+ (A) NEG /hpf   GLUCOSE, POC    Collection Time: 10/05/19 10:41 PM   Result Value Ref Range    Glucose (POC) 181 (H) 70 - 110 mg/dL   HEMOGLOBIN A1C WITH EAG    Collection Time: 10/06/19  4:34 AM   Result Value Ref Range    Hemoglobin A1c 8.2 (H) 4.2 - 5.6 %    Est. average glucose 189 mg/dL   CBC W/O DIFF    Collection Time: 10/06/19  4:34 AM   Result Value Ref Range    WBC 13.3 (H) 4.6 - 13.2 K/uL    RBC 3.99 (L) 4.20 - 5.30 M/uL    HGB 11.8 (L) 12.0 - 16.0 g/dL    HCT 37.2 35.0 - 45.0 %    MCV 93.2 74.0 - 97.0 FL    MCH 29.6 24.0 - 34.0 PG    MCHC 31.7 31.0 - 37.0 g/dL    RDW 14.3 11.6 - 14.5 %    PLATELET 974 108 - 381 K/uL    MPV 14.4 (H) 9.2 - 11.8 FL   TSH 3RD GENERATION    Collection Time: 10/06/19  4:34 AM   Result Value Ref Range    TSH 2.57 0.36 - 3.74 uIU/mL   MAGNESIUM    Collection Time: 10/06/19  4:34 AM   Result Value Ref Range    Magnesium 1.7 1.6 - 2.6 mg/dL   PHOSPHORUS    Collection Time: 10/06/19  4:34 AM   Result Value Ref Range    Phosphorus 2.9 2.5 - 4.9 MG/DL   POTASSIUM    Collection Time: 10/06/19  4:34 AM   Result Value Ref Range    Potassium 4.0 3.5 - 5.5 mmol/L   GLUCOSE, POC    Collection Time: 10/06/19  7:35 AM   Result Value Ref Range    Glucose (POC) 235 (H) 70 - 110 mg/dL        Relevant Radiology:   Ct Head Wo Cont    Result Date: 10/5/2019  EXAM: CT head CLINICAL INDICATION/HISTORY: Confusion and weakness. COMPARISON: 08/16/2016 TECHNIQUE: Axial CT imaging of the head was performed without intravenous contrast. One or more dose reduction techniques were used on this CT: automated exposure control, adjustment of the mAs and/or kVp according to patient size, and iterative reconstruction techniques.   The specific techniques used on this CT exam have been documented in the patient's electronic medical record. Digital Imaging and Communications in Medicine (DICOM) format image data are available to nonaffiliated external healthcare facilities or entities on a secure, media free, reciprocally searchable basis with patient authorization for at least a 12-month period after this study. _______________ FINDINGS: BRAIN AND POSTERIOR FOSSA: There is no intracranial hemorrhage, mass effect, or midline shift. When compared to the 2016 exam, there is new hypoattenuation involving the left occipital lobe. Additionally, there is a focus of hypoattenuation in the left anterior issa which is new from prior imaging. There is a mild degree of sulcal enlargement and ventricular dilatation consistent with diffuse volume loss. There is hypoattenuation of the periventricular white matter, which is nonspecific but most likely represents changes from chronic microangiopathy. Gray-white differentiation is maintained. EXTRA-AXIAL SPACES AND MENINGES: There are no abnormal extra-axial fluid collections. CALVARIUM: Intact. SINUSES: Clear. OTHER: None. _______________     IMPRESSION: 1. No intracranial hemorrhage, mass effect, or midline shift. 2. Age-indeterminate infarctions involving the left occipital lobe and left anterior issa. Given the degree of hypoattenuation, 3. Senescent changes of the brain including diffuse volume loss and findings most suggestive of chronic microvascular ischemia. Findings discussed with Dr. Luis M Silverman at 2:14 PM on 10/05/2019    Xr Chest Port    Result Date: 10/5/2019  EXAM: One view chest x-ray CLINICAL INDICATION/HISTORY: Weakness. COMPARISON: 06/07/2019. TECHNIQUE: Single AP view of the chest was obtained. _______________ FINDINGS: HEART, VESSELS, MEDIASTINUM: Heart size is normal. No vascular congestion. There is atherosclerosis of the thoracic aorta. LUNGS, PLEURAL SPACES: The lungs are clear. No effusion or pneumothorax.  BONY THORAX, SOFT TISSUES: Unremarkable. _______________ IMPRESSION: No acute cardiopulmonary process.         Impression:     1) 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 of indeterminate age and  time of onset  2) UTI   3) Advanced age    Plan:     Patient will need MRI of Brain and MRA of head and neck to better ascertain age of stroke especially since she has poor insight into the loss of her visual field (no obvious neglect syndrome)  Complete stroke w/u with Echo and cardiac monitoring   mg ad unless clear evidence of thromboembolic etiology or evident left PCA arterial stenosis        Signed By:  Yuliet Bautista DO     10/6/2019    [facility] for Inpatient Consultation

## 2019-10-06 NOTE — PROGRESS NOTES
1900  -- Bedside, Verbal and Written shift change report given to [address] (oncoming nurse) by Lynette Valdez nurse). Report included the following information SBAR, Kardex, Intake/Output, MAR and Recent Results.       8450 -- PM medications administered, pt tolerated with ease, will continue to monitor.     0000 -- Shift reassessment, pt condition unchanged, will continue to monitor.      0300 --  Shift reassessment, pt condition unchanged, will continue to monitor.         0700 -- Bedside, Verbal and Written shift change report given to Chandler Carey nurse) by BHAVNA (offgoing nurse). Report included the following information SBAR, Kardex, Intake/Output, MAR and Recent Results. Skin assessment completed.

## 2019-10-07 LAB
ANION GAP SERPL CALC-SCNC: 10 MMOL/L (ref 3–18)
BASOPHILS # BLD: 0 K/UL (ref 0–0.1)
BASOPHILS NFR BLD: 0 % (ref 0–2)
BUN SERPL-MCNC: 16 MG/DL (ref 7–18)
BUN/CREAT SERPL: 16 (ref 12–20)
CALCIUM SERPL-MCNC: 8.9 MG/DL (ref 8.5–10.1)
CHLORIDE SERPL-SCNC: 103 MMOL/L (ref 100–111)
CO2 SERPL-SCNC: 25 MMOL/L (ref 21–32)
CREAT SERPL-MCNC: 1 MG/DL (ref 0.6–1.3)
DIFFERENTIAL METHOD BLD: ABNORMAL
EOSINOPHIL # BLD: 0.3 K/UL (ref 0–0.4)
EOSINOPHIL NFR BLD: 2 % (ref 0–5)
ERYTHROCYTE [DISTWIDTH] IN BLOOD BY AUTOMATED COUNT: 14.2 % (ref 11.6–14.5)
GLUCOSE BLD STRIP.AUTO-MCNC: 202 MG/DL (ref 70–110)
GLUCOSE BLD STRIP.AUTO-MCNC: 221 MG/DL (ref 70–110)
GLUCOSE BLD STRIP.AUTO-MCNC: 237 MG/DL (ref 70–110)
GLUCOSE BLD STRIP.AUTO-MCNC: 276 MG/DL (ref 70–110)
GLUCOSE SERPL-MCNC: 179 MG/DL (ref 74–99)
HCT VFR BLD AUTO: 36 % (ref 35–45)
HGB BLD-MCNC: 11.4 G/DL (ref 12–16)
INR PPP: 1.1 (ref 0.8–1.2)
LYMPHOCYTES # BLD: 2.7 K/UL (ref 0.9–3.6)
LYMPHOCYTES NFR BLD: 24 % (ref 21–52)
MAGNESIUM SERPL-MCNC: 1.8 MG/DL (ref 1.6–2.6)
MCH RBC QN AUTO: 29.2 PG (ref 24–34)
MCHC RBC AUTO-ENTMCNC: 31.7 G/DL (ref 31–37)
MCV RBC AUTO: 92.3 FL (ref 74–97)
MONOCYTES # BLD: 0.8 K/UL (ref 0.05–1.2)
MONOCYTES NFR BLD: 7 % (ref 3–10)
NEUTS SEG # BLD: 7.7 K/UL (ref 1.8–8)
NEUTS SEG NFR BLD: 67 % (ref 40–73)
PHOSPHATE SERPL-MCNC: 3.3 MG/DL (ref 2.5–4.9)
PLATELET # BLD AUTO: 190 K/UL (ref 135–420)
PMV BLD AUTO: 14.1 FL (ref 9.2–11.8)
POTASSIUM SERPL-SCNC: 3.8 MMOL/L (ref 3.5–5.5)
PROTHROMBIN TIME: 13.5 SEC (ref 11.5–15.2)
RBC # BLD AUTO: 3.9 M/UL (ref 4.2–5.3)
SODIUM SERPL-SCNC: 138 MMOL/L (ref 136–145)
WBC # BLD AUTO: 11.5 K/UL (ref 4.6–13.2)

## 2019-10-07 PROCEDURE — 85610 PROTHROMBIN TIME: CPT

## 2019-10-07 PROCEDURE — 65270000029 HC RM PRIVATE

## 2019-10-07 PROCEDURE — 74011250637 HC RX REV CODE- 250/637: Performed by: HOSPITALIST

## 2019-10-07 PROCEDURE — 74011250636 HC RX REV CODE- 250/636: Performed by: PHYSICIAN ASSISTANT

## 2019-10-07 PROCEDURE — 74011636637 HC RX REV CODE- 636/637: Performed by: HOSPITALIST

## 2019-10-07 PROCEDURE — 74011000258 HC RX REV CODE- 258: Performed by: HOSPITALIST

## 2019-10-07 PROCEDURE — 84100 ASSAY OF PHOSPHORUS: CPT

## 2019-10-07 PROCEDURE — 82962 GLUCOSE BLOOD TEST: CPT

## 2019-10-07 PROCEDURE — 92610 EVALUATE SWALLOWING FUNCTION: CPT

## 2019-10-07 PROCEDURE — 74011250637 HC RX REV CODE- 250/637: Performed by: PHYSICIAN ASSISTANT

## 2019-10-07 PROCEDURE — 36415 COLL VENOUS BLD VENIPUNCTURE: CPT

## 2019-10-07 PROCEDURE — 83735 ASSAY OF MAGNESIUM: CPT

## 2019-10-07 PROCEDURE — 80048 BASIC METABOLIC PNL TOTAL CA: CPT

## 2019-10-07 PROCEDURE — 74011250636 HC RX REV CODE- 250/636: Performed by: HOSPITALIST

## 2019-10-07 PROCEDURE — 85025 COMPLETE CBC W/AUTO DIFF WBC: CPT

## 2019-10-07 RX ORDER — ASPIRIN 325 MG
325 TABLET, DELAYED RELEASE (ENTERIC COATED) ORAL DAILY
Status: DISCONTINUED | OUTPATIENT
Start: 2019-10-08 | End: 2019-10-08 | Stop reason: HOSPADM

## 2019-10-07 RX ORDER — INSULIN LISPRO 100 [IU]/ML
INJECTION, SOLUTION INTRAVENOUS; SUBCUTANEOUS 4 TIMES DAILY
Status: DISCONTINUED | OUTPATIENT
Start: 2019-10-07 | End: 2019-10-08 | Stop reason: HOSPADM

## 2019-10-07 RX ORDER — GUAIFENESIN 100 MG/5ML
LIQUID (ML) ORAL
Status: DISCONTINUED
Start: 2019-10-07 | End: 2019-10-07

## 2019-10-07 RX ORDER — ASPIRIN 325 MG
325 TABLET ORAL DAILY
Status: DISCONTINUED | OUTPATIENT
Start: 2019-10-07 | End: 2019-10-07

## 2019-10-07 RX ORDER — ASPIRIN 81 MG/1
81 TABLET ORAL DAILY
Status: DISCONTINUED | OUTPATIENT
Start: 2019-10-07 | End: 2019-10-07

## 2019-10-07 RX ADMIN — INSULIN LISPRO 9 UNITS: 100 INJECTION, SOLUTION INTRAVENOUS; SUBCUTANEOUS at 12:37

## 2019-10-07 RX ADMIN — INSULIN LISPRO 6 UNITS: 100 INJECTION, SOLUTION INTRAVENOUS; SUBCUTANEOUS at 17:40

## 2019-10-07 RX ADMIN — ATORVASTATIN CALCIUM 80 MG: 40 TABLET, FILM COATED ORAL at 22:11

## 2019-10-07 RX ADMIN — CEFTRIAXONE 1 G: 1 INJECTION, POWDER, FOR SOLUTION INTRAMUSCULAR; INTRAVENOUS at 12:24

## 2019-10-07 RX ADMIN — HEPARIN SODIUM 5000 UNITS: 5000 INJECTION INTRAVENOUS; SUBCUTANEOUS at 10:54

## 2019-10-07 RX ADMIN — INSULIN LISPRO 6 UNITS: 100 INJECTION, SOLUTION INTRAVENOUS; SUBCUTANEOUS at 09:00

## 2019-10-07 RX ADMIN — ASPIRIN 81 MG: 81 TABLET, COATED ORAL at 10:54

## 2019-10-07 RX ADMIN — INSULIN LISPRO 6 UNITS: 100 INJECTION, SOLUTION INTRAVENOUS; SUBCUTANEOUS at 22:12

## 2019-10-07 RX ADMIN — HEPARIN SODIUM 5000 UNITS: 5000 INJECTION INTRAVENOUS; SUBCUTANEOUS at 19:14

## 2019-10-07 RX ADMIN — HEPARIN SODIUM 5000 UNITS: 5000 INJECTION INTRAVENOUS; SUBCUTANEOUS at 04:03

## 2019-10-07 NOTE — PROGRESS NOTES
1900  -- Bedside, Verbal and Written shift change report given to [address] (oncoming nurse) by [facility] nurse). Report included the following information SBAR, Kardex, Intake/Output, MAR and Recent Results.       3254 -- PM medications administered, pt tolerated with ease, will continue to monitor.     0000 -- Shift reassessment, pt condition unchanged, will continue to monitor.      0300 --  Shift reassessment, pt condition unchanged, will continue to monitor.          -- Bedside, Verbal and Written shift change report given to   (oncoming nurse) by BHAVNA (offgoing nurse). Report included the following information SBAR, Kardex, Intake/Output, MAR and Recent Results. Skin assessment completed.

## 2019-10-07 NOTE — PROGRESS NOTES
Problem: Pressure Injury - Risk of  Goal: *Prevention of pressure injury  Description  Document Nomi Scale and appropriate interventions in the flowsheet. Outcome: Progressing Towards Goal  Note:   Pressure Injury Interventions:       Moisture Interventions: Internal/External urinary devices    Activity Interventions: Increase time out of bed, Pressure redistribution bed/mattress(bed type), PT/OT evaluation    Mobility Interventions: Pressure redistribution bed/mattress (bed type), PT/OT evaluation    Nutrition Interventions: Document food/fluid/supplement intake    Friction and Shear Interventions: Minimize layers                Problem: Patient Education: Go to Patient Education Activity  Goal: Patient/Family Education  Outcome: Progressing Towards Goal     Problem: Falls - Risk of  Goal: *Absence of Falls  Description  Document Sujatha Huitron Fall Risk and appropriate interventions in the flowsheet. Outcome: Progressing Towards Goal  Note:   Fall Risk Interventions:  Mobility Interventions: Bed/chair exit alarm, Communicate number of staff needed for ambulation/transfer    Mentation Interventions: Adequate sleep, hydration, pain control, Bed/chair exit alarm, Door open when patient unattended, Eyeglasses and hearing aids, Reorient patient, More frequent rounding, Update white board    Medication Interventions: Evaluate medications/consider consulting pharmacy    Elimination Interventions: Call light in reach, Toileting schedule/hourly rounds, Patient to call for help with toileting needs              Problem: Patient Education: Go to Patient Education Activity  Goal: Patient/Family Education  Outcome: Progressing Towards Goal     Problem: Diabetes Self-Management  Goal: *Disease process and treatment process  Description  Define diabetes and identify own type of diabetes; list 3 options for treating diabetes.   Outcome: Progressing Towards Goal  Goal: *Incorporating nutritional management into lifestyle  Description  Describe effect of type, amount and timing of food on blood glucose; list 3 methods for planning meals. Outcome: Progressing Towards Goal  Goal: *Incorporating physical activity into lifestyle  Description  State effect of exercise on blood glucose levels. Outcome: Progressing Towards Goal  Goal: *Developing strategies to promote health/change behavior  Description  Define the ABC's of diabetes; identify appropriate screenings, schedule and personal plan for screenings. Outcome: Progressing Towards Goal  Goal: *Using medications safely  Description  State effect of diabetes medications on diabetes; name diabetes medication taking, action and side effects. Outcome: Progressing Towards Goal  Goal: *Monitoring blood glucose, interpreting and using results  Description  Identify recommended blood glucose targets  and personal targets. Outcome: Progressing Towards Goal  Goal: *Prevention, detection, treatment of acute complications  Description  List symptoms of hyper- and hypoglycemia; describe how to treat low blood sugar and actions for lowering  high blood glucose level. Outcome: Progressing Towards Goal  Goal: *Prevention, detection and treatment of chronic complications  Description  Define the natural course of diabetes and describe the relationship of blood glucose levels to long term complications of diabetes.   Outcome: Progressing Towards Goal  Goal: *Developing strategies to address psychosocial issues  Description  Describe feelings about living with diabetes; identify support needed and support network  Outcome: Progressing Towards Goal  Goal: *Insulin pump training  Outcome: Progressing Towards Goal  Goal: *Sick day guidelines  Outcome: Progressing Towards Goal  Goal: *Patient Specific Goal (EDIT GOAL, INSERT TEXT)  Outcome: Progressing Towards Goal     Problem: Patient Education: Go to Patient Education Activity  Goal: Patient/Family Education  Outcome: Progressing Towards Goal     Problem: Urinary Tract Infection - Adult  Goal: *Absence of infection signs and symptoms  Outcome: Progressing Towards Goal     Problem: Patient Education: Go to Patient Education Activity  Goal: Patient/Family Education  Outcome: Progressing Towards Goal     Problem: Discharge Planning  Goal: *Discharge to safe environment  Outcome: Progressing Towards Goal     Problem: Patient Education: Go to Patient Education Activity  Goal: Patient/Family Education  Outcome: Progressing Towards Goal     Problem: Patient Education: Go to Patient Education Activity  Goal: Patient/Family Education  Outcome: Progressing Towards Goal     Problem: TIA/CVA Stroke: Day 2 Until Discharge  Goal: Activity/Safety  Outcome: Progressing Towards Goal  Goal: Diagnostic Test/Procedures  Outcome: Progressing Towards Goal  Goal: Nutrition/Diet  Outcome: Progressing Towards Goal  Goal: Discharge Planning  Outcome: Progressing Towards Goal  Goal: Medications  Outcome: Progressing Towards Goal  Goal: Respiratory  Outcome: Progressing Towards Goal  Goal: Treatments/Interventions/Procedures  Outcome: Progressing Towards Goal  Goal: Psychosocial  Outcome: Progressing Towards Goal  Goal: *Verbalizes anxiety and depression are reduced or absent  Outcome: Progressing Towards Goal  Goal: *Absence of aspiration  Outcome: Progressing Towards Goal  Goal: *Absence of deep venous thrombosis signs and symptoms(Stroke Metric)  Outcome: Progressing Towards Goal  Goal: *Optimal pain control at patient's stated goal  Outcome: Progressing Towards Goal  Goal: *Tolerating diet  Outcome: Progressing Towards Goal  Goal: *Ability to perform ADLs and demonstrates progressive mobility and function  Outcome: Progressing Towards Goal  Goal: *Stroke education continued(Stroke Metric)  Outcome: Progressing Towards Goal     Problem: Ischemic Stroke: Discharge Outcomes  Goal: *Verbalizes anxiety and depression are reduced or absent  Outcome: Progressing Towards Goal  Goal: *Verbalize understanding of risk factor modification(Stroke Metric)  Outcome: Progressing Towards Goal  Goal: *Hemodynamically stable  Outcome: Progressing Towards Goal  Goal: *Absence of aspiration pneumonia  Outcome: Progressing Towards Goal  Goal: *Aware of needed dietary changes  Outcome: Progressing Towards Goal  Goal: *Verbalize understanding of prescribed medications including anti-coagulants, anti-lipid, and/or anti-platelets(Stroke Metric)  Outcome: Progressing Towards Goal  Goal: *Tolerating diet  Outcome: Progressing Towards Goal  Goal: *Aware of follow-up diagnostics related to anticoagulants  Outcome: Progressing Towards Goal  Goal: *Ability to perform ADLs and demonstrates progressive mobility and function  Outcome: Progressing Towards Goal  Goal: *Absence of DVT(Stroke Metric)  Outcome: Progressing Towards Goal  Goal: *Absence of aspiration  Outcome: Progressing Towards Goal  Goal: *Optimal pain control at patient's stated goal  Outcome: Progressing Towards Goal  Goal: *Home safety concerns addressed  Outcome: Progressing Towards Goal  Goal: *Describes available resources and support systems  Outcome: Progressing Towards Goal  Goal: *Verbalizes understanding of activation of EMS(911) for stroke symptoms(Stroke Metric)  Outcome: Progressing Towards Goal  Goal: *Understands and describes signs and symptoms to report to providers(Stroke Metric)  Outcome: Progressing Towards Goal  Goal: *Neurolgocially stable (absence of additional neurological deficits)  Outcome: Progressing Towards Goal  Goal: *Verbalizes importance of follow-up with primary care physician(Stroke Metric)  Outcome: Progressing Towards Goal  Goal: *Smoking cessation discussed,if applicable(Stroke Metric)  Outcome: Progressing Towards Goal  Goal: *Depression screening completed(Stroke Metric)  Outcome: Progressing Towards Goal

## 2019-10-07 NOTE — PROGRESS NOTES
Problem: Dysphagia (Adult)  Goal: *Acute Goals and Plan of Care (Insert Text)  Description  Recommendations:  Diet: regular/thin  Meds: per pt preference  Aspiration Precautions  Other: eval only      Patient will:  1. Participate in training and education related to continued aspiration risk, diet recs and compensatory strategies (goal met). Outcome: Resolved/Met     SPEECH LANGUAGE PATHOLOGY BEDSIDE SWALLOW EVALUATION AND DISCHARGE    Patient: Manuela Crain (74 y.o. female)  Date: 10/7/2019  Primary Diagnosis: TIA (transient ischemic attack) [G45.9]        Precautions: falls     PLOF: regular/thin    ASSESSMENT :  Clinical beside swallow eval completed per MD orders. Pt A&Ox3. Functional communication. Intelligibility >90%. Cognitive-linguistic function appears intact. OM examination revealed oral motor structures functional for mastication and deglutition. Presented with thin liquid + straw and solid trials. Exhibited adequate bolus cohesion, manipulation and A-P transit. Further exhibited adequate swallow timing/reflex and hyolaryngeal excursion. Pt able to manipulate and clear with 0 clinical s/s aspiration and/or oropharyngeal dysphagia. Pt safe for regular solid, thin liquid diet. 0 formal ST needs for dysphagia indicated at this time. SLP educated pt on role of speech therapist in current setting with re: speech/swallow; verbalized comprehension. SLP available for re-evaluation if indicated by MD. Results d/w RD. Thank you for this referral.   Lanny Condon, SLP     PLAN :  Recommendations and Planned Interventions:  No formal ST needs ID'd for dysphagia. Eval only. Discharge Recommendations: Home Health     SUBJECTIVE:   Patient stated Gonsalo Lambert you, I just don't have much of an appetite.     OBJECTIVE:     Past Medical History:   Diagnosis Date    Diabetes ([address]     Hypertension      Past Surgical History:   Procedure Laterality Date    HX HYSTERECTOMY  age 62    HX ORTHOPAEDIC Home Situation:   Home Situation  Home Environment: Private residence  # Steps to Enter: 3  Rails to Enter: Yes  Hand Rails : Bilateral  Wheelchair Ramp: No  One/Two Story Residence: One story  Living Alone: No  Support Systems: Family member(s)  Patient Expects to be Discharged to[de-identified] Private residence  Current DME Used/Available at Home: Shower chair, Grab bars, Adaptive bathing aides, Commode, bedside  Tub or Shower Type: Tub/Shower combination    Diet prior to admission: regular/thin  Current Diet:  regular/thin     Cognitive and Communication Status:  Neurologic State: Alert  Orientation Level: Oriented to person, Oriented to place, Oriented to situation  Cognition: Appropriate for age attention/concentration  Perception: Appears intact  Perseveration: No perseveration noted  Safety/Judgement: Fall prevention  Oral Assessment:  Oral Assessment  Labial: No impairment  Dentition: Natural  Oral Hygiene: Good  Lingual: No impairment  Velum: No impairment  Mandible: No impairment  P.O. Trials:  Patient Position: HOB 60  Vocal quality prior to P.O.: No impairment  Consistency Presented: Thin liquid; Solid  How Presented: Self-fed/presented;Straw;Successive swallows     Bolus Acceptance: No impairment  Bolus Formation/Control: No impairment     Propulsion: No impairment  Oral Residue: None  Initiation of Swallow: No impairment  Laryngeal Elevation: Functional  Aspiration Signs/Symptoms: None  Pharyngeal Phase Characteristics: No impairment, issues, or problems      Cues for Modifications: None       Oral Phase Severity: No impairment  Pharyngeal Phase Severity : No impairment    PAIN:  Pain level pre-treatment: 0/10   Pain level post-treatment: 0/10     After evaluation:   ?            Patient left in no apparent distress sitting up in chair  ? Patient left in no apparent distress in bed  ? Call bell left within reach  ? Nursing notified  ? Family present  ? Caregiver present  ? Bed alarm activated      COMMUNICATION/EDUCATION:   ?            Aspiration precautions; swallow safety; compensatory techniques. ?            Patient/family have participated as able in goal setting and plan of care. ?            Patient/family agree to work toward stated goals and plan of care. ?            Patient understands intent and goals of therapy; neutral about participation. ? Patient unable to participate in goal setting/plan of care; educ ongoing with interdisciplinary staff  ? Posted safety precautions in patient's room.     Thank you for this referral.  Lianet Ashley, SLP  Time Calculation: 20 mins

## 2019-10-07 NOTE — PROGRESS NOTES
conducted an initial consultation and Spiritual Assessment for Charlene Thompson, who is a 80 y.o.,female. Patients Primary Language is: [place]. According to the patients EMR Buddhist Affiliation is: [facility].     The reason the Patient came to the hospital is:   Patient Active Problem List    Diagnosis Date Noted    Hypokalemia 10/05/2019    Acute encephalopathy 10/05/2019    Diabetes mellitus type 2, controlled ([facility] [address] 10/05/2019    TIA (transient ischemic attack) 10/05/2019    Obesity 10/05/2019    UTI (urinary tract infection) 12/30/2013        The  provided the following Interventions:  Initiated a relationship of care and support. Explored issues of andrea, belief, spirituality and Zoroastrian/ritual needs while hospitalized. Listened empathically. Provided chaplaincy education. Provided information about Spiritual Care Services. Offered prayer and assurance of continued prayers on patients behalf. Chart reviewed. The following outcomes were achieved:  Patient shared limited information about both their medical narrative and spiritual journey/beliefs. Patient processed feeling about current hospitalization. Patient expressed gratitude for pastoral care visit. Assessment:  Patient does not have any Zoroastrian/cultural needs that will affect patients preferences in health care. There are no further spiritual or Zoroastrian issues which require Spiritual Care Services interventions at this time. Plan:  Chaplains will continue to follow and will provide pastoral care on an as needed/requested basis    . Ada Arzola   Spiritual Care   [phone]

## 2019-10-07 NOTE — PROGRESS NOTES
ARU/IPR REFERRAL CONTACT NOTE  [id number] [facility]    Re: Nadira Flynn, [facility] for IP Rehab Screen received. Will review case and advise as appropriate. Thank you for the consult.     Lakia Wheatley

## 2019-10-07 NOTE — PROGRESS NOTES
Problem: Discharge Planning  Goal: *Discharge to safe environment  Outcome: Progressing Towards Goal    Plan: home with home health    Chart reviewed. Plan remains home with home health. [facility], ext [phone].

## 2019-10-07 NOTE — DIABETES MGMT
NUTRITIONAL ASSESSMENT GLYCEMIC CONTROL/ PLAN OF CARE     Charlene Ortez           89 y.o.           10/5/2019                 1. Altered mental status, unspecified altered mental status type    2. Lower urinary tract infectious disease    3. Leukocytosis, unspecified type    4. Hypokalemia       INTERVENTIONS/PLAN:   1. Monitor BG; should BG remain consistently elevated, may want to consider adding a low dose of basal insulin (would start with 10 units Lantus/day or 0.1 units/kg). 2.  Monitor po intake, labs and weights. ASSESSMENT:   Nutritional Status:  Pt is 191% ideal weight; pt appears well nourished. Po intake is sub optimal at this time. Nutrition Diagnoses: Altered nutrition related labs due to T2DM as evidenced by A1C of 8.2%. Obesity due to excess energy intake as evidenced by BMI of 39.2 kg/m2. Inadequate oral food and beverage intake due to poor appetite as evidenced by po intake < 50% lunch on 10/7/19. Diabetes Management:   Pt with most BG readings elevated but suggest not adding basal insulin at his time due to BG reading of 115 last night and pt's advanced age. Chart indicates pt with metformin and Actoplus Met allergy. GFR - 51  Recent blood glucose:    10/7:  221, 276  10/6:  235, 350, 287, 115  Within target range (non-ICU: <140; ICU<180): [] Yes   [x]  No    Current Insulin regimen:   corrective lispro, very insulin resistant dosing ACHS  Home medication/insulin regimen:   Pt denies taking any diabetes medications at home and states she does not have diabetes. Per PTA list:  Lantus (no dose) and Humalog 75/25 26 units 2 times daily  HbA1c:  8.2% - ave BG has been ~ 186 mg/dL over past 3 months  Adequate glycemic control PTA:  [x] Yes, considering advanced age and co-morbitities  [] No       SUBJECTIVE/OBJECTIVE:   Information obtained from: chart review, pt  Pt w/ PMHx including T2DM, HTN, MI and presented to ED  with AMS, weakness, poor po intake x 2 days at home.   Pt admitted with UTI vs. CVA/TIA   SLP note reviewed. 10/7:  Pt reports her appetite has been poor but she denies N/V. Her usual weight is 229 lbs. She denies having food allergies. She states she does not have diabetes. Diet: consistent CHO    Patient Vitals for the past 100 hrs:   % Diet Eaten   10/07/19 1237 75 %   10/07/19 0920 50 %   10/06/19 1825 0 %       Medications: [x]                Reviewed   Pertinent:  Lipitor 80 mg/d  Most Recent POC Glucose:   Recent Labs     10/07/19  0130 10/05/19  1330   * 82         Labs:   Lab Results   Component Value Date/Time    Hemoglobin A1c 8.2 (H) 10/06/2019 04:34 AM     Lab Results   Component Value Date/Time    Hemoglobin A1c 8.2 (H) 10/06/2019 04:34 AM    Hemoglobin A1c 7.4 (H) 12/30/2013 12:05 PM    Hemoglobin A1c 6.9 (H) 06/09/2010 01:32 PM     Lab Results   Component Value Date/Time    Sodium 138 10/07/2019 01:30 AM    Potassium 3.8 10/07/2019 01:30 AM    Chloride 103 10/07/2019 01:30 AM    CO2 25 10/07/2019 01:30 AM    Anion gap 10 10/07/2019 01:30 AM    Glucose 179 (H) 10/07/2019 01:30 AM    BUN 16 10/07/2019 01:30 AM    Creatinine 1.00 10/07/2019 01:30 AM    Calcium 8.9 10/07/2019 01:30 AM    Magnesium 1.8 10/07/2019 01:30 AM    Phosphorus 3.3 10/07/2019 01:30 AM    Albumin 3.1 (L) 10/05/2019 01:30 PM       Anthropometrics: IBW : 54.4 kg (120 lb), % IBW (Calculated): 190.83 %,    BMI - 39.2 kg.m2  Wt Readings from Last 1 Encounters:   10/06/19 103.9 kg (229 lb)      Ht Readings from Last 1 Encounters:   10/06/19 5' 4\" (1.626 m)     Estimated Nutrition Needs:  1700 Kcals/day, Protein (g): 82 g Fluid (ml): 1700 ml  Based on:   [x]          Actual BW    []          ABW   []            Adjusted BW         Nutrition Interventions:  Insulin adjustments  Implement preferences  Goal:   Blood glucose will be within target range of  mg/dL by 10/10/19. Pt will consume > 75% meals by 10/12/19.        Nutrition Monitoring and Evaluation      [x] Monitor po intake on meal rounds  [x]     Continue inpatient monitoring and intervention  []     Other:      Nutrition Risk:  []   High     [x]  Moderate    []  Minimal/Uncompromised    Tr Munoz RD, CDE   Office:  [address] [place] [address] Pager:  [phone]

## 2019-10-07 NOTE — PROGRESS NOTES
Internal Medicine Progress Note        NAME: Akanksha Archuleta   :  1923  MRM:  [id number]    Date/Time: 10/7/2019        ASSESSMENT/PLAN:    Acute encephalopathy  -UTI vs CVA vs other. Neurology consulted. Pending urine culture. Cont rocephin.     UTI  Abx, Adjust medication as culture grows and pending sensitivity   Ucx - Pending     CVA/TIA   Admit to telemetry bed  . PT/OT/SLP/CM Consult. ASA. Statin . FLP.       - Neurology consulted. CT head age intermediate infarct   MRI shows acute infarct involving left occipital lobe. 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 Will need to discuss with Neuro regarding need for Plavix.     DM  Provide SSI, hypoglycemia protocol and frequent Accu checks. Provide SSI, hypoglycemia protocol and frequent Accu checks. Education,  diabetic educator     HTN. Son Report in good control     Obesity . Body mass index is 39.31 kg/m².      -Code status : FULL    Lab Review:     Recent Labs     10/07/19  0130 10/06/19  0434 10/05/19  1330   WBC 11.5 13.3* 14.9*   HGB 11.4* 11.8* 12.6   HCT 36.0 37.2 39.2    204 217     Recent Labs     10/07/19  0130 10/06/19  0434 10/05/19  1330     --  137   K 3.8 4.0 3.2*     --  99*   CO2 25  --  30   *  --  82   BUN 16  --  23*   CREA 1.00  --  1.32*   CA 8.9  --  9.2   MG 1.8 1.7 1.7   PHOS 3.3 2.9  --    ALB  --   --  3.1*   TBILI  --   --  0.4   SGOT  --   --  15   ALT  --   --  15   INR 1.1  --   --      Lab Results   Component Value Date/Time    Glucose (POC) 276 (H) 10/07/2019 12:27 PM    Glucose (POC) 221 (H) 10/07/2019 07:59 AM    Glucose (POC) 119 (H) 10/06/2019 10:31 PM    Glucose (POC) 287 (H) 10/06/2019 04:29 PM    Glucose (POC) 350 (H) 10/06/2019 01:46 PM     No results for input(s): PH, PCO2, PO2, HCO3, FIO2 in the last 72 hours.   Recent Labs     10/07/19  0130   INR 1.1       Lab Results   Component Value Date/Time Specimen Description: BLOOD 01/04/2014 07:00 PM    Specimen Description: BLOOD 01/04/2014 06:00 PM    Specimen Description: CATH URINE 12/30/2013 12:44 PM     Lab Results   Component Value Date/Time    Culture result: >100,000 COLONIES/mL GRAM NEGATIVE RODS (A) 10/05/2019 04:45 PM    Culture result: >100,000  COLONIES/mL  KLEBSIELLA PNEUMONIAE   08/16/2016 04:15 AM    Culture result:  08/16/2016 04:15 AM     >100,000  COLONIES/mL  KLEBSIELLA PNEUMONIAE  2ND MORPHOTYPE         All Cardiac Markers in the last 24 hours:   No results found for: CPK, CK, CKMMB, CKMB, RCK3, CKMBT, CKNDX, CKND1, OLIVER, TROPT, TROIQ, DAJA, TROPT, TNIPOC, BNP, BNPP           Subjective:       Pt denies any complaints  Denies vision symptoms  Had breakfast.  Family at bedside. Objective:     Vitals:  Last 24hrs VS reviewed since prior progress note. Most recent are:    Visit Vitals  /70 (BP 1 Location: Right arm, BP Patient Position: Head of bed elevated (Comment degrees))   Pulse 73   Temp 98.5 °F (36.9 °C)   Resp 18   Ht 5' 4\" (1.626 m)   Wt 103.9 kg (229 lb)   SpO2 94%   BMI 39.31 kg/m²     SpO2 Readings from Last 6 Encounters:   10/07/19 94%   06/07/19 98%   04/21/19 99%   04/26/18 99%   08/16/16 94%   01/06/14 97%            Intake/Output Summary (Last 24 hours) at 10/7/2019 1237  Last data filed at 10/7/2019 0921  Gross per 24 hour   Intake 170 ml   Output 600 ml   Net -430 ml          Physical Exam:   Gen: Well-developed, well-nourished, in no acute distress  HEENT: Head atraumatic, normocephalic ,  hearing intact to voice, moist mucous membranes  Neck: No apparent JVD, Supple,   thyroid non-tender  Resp: No accessory muscle use, Bilateral BS present,clear breath sounds without wheezes rales or rhonchi  Card:   normal S1, S2 without thrills, bruits or Gallop. No significant lower leg peripheral edema.   Abd:  Soft, non-tender, not distended, normoactive bowel sounds are present   Musc: No cyanosis or clubbing  Skin: No rashes or ulcers, skin turgor is good   Neuro:  Cranial nerves are grossly intact, no clear area of focal motor weakness, follows commands appropriately   alert     Medications Reviewed: (see below)    Lab Data Reviewed: (see below)    ______________________________________________________________________    Medications:     Current Facility-Administered Medications   Medication Dose Route Frequency    insulin lispro (HUMALOG) injection   SubCUTAneous QID    [START ON 10/8/2019] aspirin delayed-release tablet 325 mg  325 mg Oral DAILY    cefTRIAXone (ROCEPHIN) 1 g in 0.9% sodium chloride (MBP/ADV) 50 mL MBP  1 g IntraVENous Q24H    dextrose 10 % infusion 125-250 mL  125-250 mL IntraVENous PRN    influenza vaccine 2019-20 (6 mos+)(PF) (FLUARIX/FLULAVAL/FLUZONE QUAD) injection 0.5 mL  0.5 mL IntraMUSCular PRIOR TO DISCHARGE    atorvastatin (LIPITOR) tablet 80 mg  80 mg Oral QHS    acetaminophen (TYLENOL) tablet 650 mg  650 mg Oral Q4H PRN    labetalol (NORMODYNE;TRANDATE) injection 5 mg  5 mg IntraVENous Q10MIN PRN    glucose chewable tablet 16 g  4 Tab Oral PRN    glucagon (GLUCAGEN) injection 1 mg  1 mg IntraMUSCular PRN    heparin (porcine) injection 5,000 Units  5,000 Units SubCUTAneous Q8H          Total time spent with patient: 35 minutes                  Care Plan discussed with: Patient, Family, Care Manager and Nursing Staff    Discussed:  Care Plan    Prophylaxis:  Hep SQ    Disposition:  Home w/Family             Attending Physician: Geovanny Grady MD

## 2019-10-07 NOTE — PROGRESS NOTES
0700 verbal bedside report received. Pt resting comfortably at this time. No needs voiced. Call light within reach    1200 family at bedside. No needs at this time. Pt started on IV Rocephin; anticipated D/C 10/08/19.    1400 Pt sleeping at this time, appears comfortable. Per Dr Martín Garcia continue cardiac monitor. Call light within reach    [phone] Pt resting comfortably after dinner. No needs voiced at this time.

## 2019-10-07 NOTE — PROGRESS NOTES
Problem: Pressure Injury - Risk of  Goal: *Prevention of pressure injury  Description  Document Nomi Scale and appropriate interventions in the flowsheet. Outcome: Progressing Towards Goal  Note:   Pressure Injury Interventions:       Moisture Interventions: Internal/External urinary devices    Activity Interventions: Pressure redistribution bed/mattress(bed type), Increase time out of bed    Mobility Interventions: Float heels    Nutrition Interventions: Document food/fluid/supplement intake    Friction and Shear Interventions: Minimize layers                Problem: Patient Education: Go to Patient Education Activity  Goal: Patient/Family Education  Outcome: Progressing Towards Goal     Problem: Falls - Risk of  Goal: *Absence of Falls  Description  Document Flip Fall Risk and appropriate interventions in the flowsheet. Outcome: Progressing Towards Goal  Note:   Fall Risk Interventions:  Mobility Interventions: Bed/chair exit alarm, Communicate number of staff needed for ambulation/transfer    Mentation Interventions: Adequate sleep, hydration, pain control, Bed/chair exit alarm, Door open when patient unattended, Eyeglasses and hearing aids, Reorient patient, More frequent rounding, Update white board    Medication Interventions: Evaluate medications/consider consulting pharmacy    Elimination Interventions: Call light in reach, Toileting schedule/hourly rounds, Patient to call for help with toileting needs              Problem: Patient Education: Go to Patient Education Activity  Goal: Patient/Family Education  Outcome: Progressing Towards Goal     Problem: Diabetes Self-Management  Goal: *Disease process and treatment process  Description  Define diabetes and identify own type of diabetes; list 3 options for treating diabetes.   Outcome: Progressing Towards Goal  Goal: *Incorporating nutritional management into lifestyle  Description  Describe effect of type, amount and timing of food on blood glucose; list 3 methods for planning meals. Outcome: Progressing Towards Goal  Goal: *Incorporating physical activity into lifestyle  Description  State effect of exercise on blood glucose levels. Outcome: Progressing Towards Goal  Goal: *Developing strategies to promote health/change behavior  Description  Define the ABC's of diabetes; identify appropriate screenings, schedule and personal plan for screenings. Outcome: Progressing Towards Goal  Goal: *Using medications safely  Description  State effect of diabetes medications on diabetes; name diabetes medication taking, action and side effects. Outcome: Progressing Towards Goal  Goal: *Monitoring blood glucose, interpreting and using results  Description  Identify recommended blood glucose targets  and personal targets. Outcome: Progressing Towards Goal  Goal: *Prevention, detection, treatment of acute complications  Description  List symptoms of hyper- and hypoglycemia; describe how to treat low blood sugar and actions for lowering  high blood glucose level. Outcome: Progressing Towards Goal  Goal: *Prevention, detection and treatment of chronic complications  Description  Define the natural course of diabetes and describe the relationship of blood glucose levels to long term complications of diabetes.   Outcome: Progressing Towards Goal  Goal: *Developing strategies to address psychosocial issues  Description  Describe feelings about living with diabetes; identify support needed and support network  Outcome: Progressing Towards Goal  Goal: *Insulin pump training  Outcome: Progressing Towards Goal  Goal: *Sick day guidelines  Outcome: Progressing Towards Goal  Goal: *Patient Specific Goal (EDIT GOAL, INSERT TEXT)  Outcome: Progressing Towards Goal     Problem: Patient Education: Go to Patient Education Activity  Goal: Patient/Family Education  Outcome: Progressing Towards Goal     Problem: Urinary Tract Infection - Adult  Goal: *Absence of infection signs and symptoms  Outcome: Progressing Towards Goal     Problem: Patient Education: Go to Patient Education Activity  Goal: Patient/Family Education  Outcome: Progressing Towards Goal     Problem: Discharge Planning  Goal: *Discharge to safe environment  Outcome: Progressing Towards Goal     Problem: Patient Education: Go to Patient Education Activity  Goal: Patient/Family Education  Outcome: Progressing Towards Goal     Problem: Patient Education: Go to Patient Education Activity  Goal: Patient/Family Education  Outcome: Progressing Towards Goal     Problem: TIA/CVA Stroke: Day 2 Until Discharge  Goal: Activity/Safety  Outcome: Progressing Towards Goal  Goal: Diagnostic Test/Procedures  Outcome: Progressing Towards Goal  Goal: Nutrition/Diet  Outcome: Progressing Towards Goal  Goal: Discharge Planning  Outcome: Progressing Towards Goal  Goal: Medications  Outcome: Progressing Towards Goal  Goal: Respiratory  Outcome: Progressing Towards Goal  Goal: Treatments/Interventions/Procedures  Outcome: Progressing Towards Goal  Goal: Psychosocial  Outcome: Progressing Towards Goal  Goal: *Verbalizes anxiety and depression are reduced or absent  Outcome: Progressing Towards Goal  Goal: *Absence of aspiration  Outcome: Progressing Towards Goal  Goal: *Absence of deep venous thrombosis signs and symptoms(Stroke Metric)  Outcome: Progressing Towards Goal  Goal: *Optimal pain control at patient's stated goal  Outcome: Progressing Towards Goal  Goal: *Tolerating diet  Outcome: Progressing Towards Goal  Goal: *Ability to perform ADLs and demonstrates progressive mobility and function  Outcome: Progressing Towards Goal  Goal: *Stroke education continued(Stroke Metric)  Outcome: Progressing Towards Goal     Problem: Ischemic Stroke: Discharge Outcomes  Goal: *Verbalizes anxiety and depression are reduced or absent  Outcome: Progressing Towards Goal  Goal: *Verbalize understanding of risk factor modification(Stroke Metric)  Outcome: Progressing Towards Goal  Goal: *Hemodynamically stable  Outcome: Progressing Towards Goal  Goal: *Absence of aspiration pneumonia  Outcome: Progressing Towards Goal  Goal: *Aware of needed dietary changes  Outcome: Progressing Towards Goal  Goal: *Verbalize understanding of prescribed medications including anti-coagulants, anti-lipid, and/or anti-platelets(Stroke Metric)  Outcome: Progressing Towards Goal  Goal: *Tolerating diet  Outcome: Progressing Towards Goal  Goal: *Aware of follow-up diagnostics related to anticoagulants  Outcome: Progressing Towards Goal  Goal: *Ability to perform ADLs and demonstrates progressive mobility and function  Outcome: Progressing Towards Goal  Goal: *Absence of DVT(Stroke Metric)  Outcome: Progressing Towards Goal  Goal: *Absence of aspiration  Outcome: Progressing Towards Goal  Goal: *Optimal pain control at patient's stated goal  Outcome: Progressing Towards Goal  Goal: *Home safety concerns addressed  Outcome: Progressing Towards Goal  Goal: *Describes available resources and support systems  Outcome: Progressing Towards Goal  Goal: *Verbalizes understanding of activation of EMS(911) for stroke symptoms(Stroke Metric)  Outcome: Progressing Towards Goal  Goal: *Understands and describes signs and symptoms to report to providers(Stroke Metric)  Outcome: Progressing Towards Goal  Goal: *Neurolgocially stable (absence of additional neurological deficits)  Outcome: Progressing Towards Goal  Goal: *Verbalizes importance of follow-up with primary care physician(Stroke Metric)  Outcome: Progressing Towards Goal  Goal: *Smoking cessation discussed,if applicable(Stroke Metric)  Outcome: Progressing Towards Goal  Goal: *Depression screening completed(Stroke Metric)  Outcome: Progressing Towards Goal     Problem: Patient Education: Go to Patient Education Activity  Goal: Patient/Family Education  Outcome: Progressing Towards Goal

## 2019-10-08 ENCOUNTER — HOME HEALTH ADMISSION (OUTPATIENT)
Dept: HOME HEALTH SERVICES | Facility: HOME HEALTH | Age: 84
End: 2019-10-08
Payer: MEDICARE

## 2019-10-08 VITALS
RESPIRATION RATE: 16 BRPM | BODY MASS INDEX: 39.09 KG/M2 | TEMPERATURE: 98.1 F | HEART RATE: 75 BPM | OXYGEN SATURATION: 94 % | WEIGHT: 229 LBS | DIASTOLIC BLOOD PRESSURE: 77 MMHG | HEIGHT: 64 IN | SYSTOLIC BLOOD PRESSURE: 146 MMHG

## 2019-10-08 LAB
BACTERIA SPEC CULT: ABNORMAL
GLUCOSE BLD STRIP.AUTO-MCNC: 226 MG/DL (ref 70–110)
GLUCOSE BLD STRIP.AUTO-MCNC: 304 MG/DL (ref 70–110)
SERVICE CMNT-IMP: ABNORMAL

## 2019-10-08 PROCEDURE — 74011250636 HC RX REV CODE- 250/636: Performed by: HOSPITALIST

## 2019-10-08 PROCEDURE — 74011250636 HC RX REV CODE- 250/636: Performed by: PHYSICIAN ASSISTANT

## 2019-10-08 PROCEDURE — 74011250637 HC RX REV CODE- 250/637: Performed by: HOSPITALIST

## 2019-10-08 PROCEDURE — 97530 THERAPEUTIC ACTIVITIES: CPT

## 2019-10-08 PROCEDURE — 82962 GLUCOSE BLOOD TEST: CPT

## 2019-10-08 PROCEDURE — 74011000258 HC RX REV CODE- 258: Performed by: HOSPITALIST

## 2019-10-08 PROCEDURE — 74011636637 HC RX REV CODE- 636/637: Performed by: HOSPITALIST

## 2019-10-08 RX ORDER — GUAIFENESIN 100 MG/5ML
81 LIQUID (ML) ORAL DAILY
Qty: 30 TAB | Refills: 0 | Status: SHIPPED | OUTPATIENT
Start: 2019-10-08 | End: 2021-10-18

## 2019-10-08 RX ORDER — CEPHALEXIN 500 MG/1
500 CAPSULE ORAL 2 TIMES DAILY
Qty: 12 CAP | Refills: 0 | Status: SHIPPED | OUTPATIENT
Start: 2019-10-08 | End: 2019-10-14

## 2019-10-08 RX ORDER — INSULIN GLARGINE 100 [IU]/ML
5 INJECTION, SOLUTION SUBCUTANEOUS DAILY
Status: DISCONTINUED | OUTPATIENT
Start: 2019-10-08 | End: 2019-10-08 | Stop reason: HOSPADM

## 2019-10-08 RX ORDER — ATORVASTATIN CALCIUM 80 MG/1
80 TABLET, FILM COATED ORAL
Qty: 30 TAB | Refills: 0 | Status: SHIPPED | OUTPATIENT
Start: 2019-10-08 | End: 2021-10-18

## 2019-10-08 RX ORDER — CLOPIDOGREL BISULFATE 75 MG/1
75 TABLET ORAL DAILY
Qty: 30 TAB | Refills: 2 | Status: SHIPPED | OUTPATIENT
Start: 2019-10-08

## 2019-10-08 RX ADMIN — HEPARIN SODIUM 5000 UNITS: 5000 INJECTION INTRAVENOUS; SUBCUTANEOUS at 04:32

## 2019-10-08 RX ADMIN — INSULIN LISPRO 12 UNITS: 100 INJECTION, SOLUTION INTRAVENOUS; SUBCUTANEOUS at 12:57

## 2019-10-08 RX ADMIN — HEPARIN SODIUM 5000 UNITS: 5000 INJECTION INTRAVENOUS; SUBCUTANEOUS at 11:55

## 2019-10-08 RX ADMIN — CEFTRIAXONE 1 G: 1 INJECTION, POWDER, FOR SOLUTION INTRAMUSCULAR; INTRAVENOUS at 11:54

## 2019-10-08 RX ADMIN — ASPIRIN 325 MG: 325 TABLET, DELAYED RELEASE ORAL at 08:35

## 2019-10-08 RX ADMIN — INSULIN LISPRO 226 UNITS: 100 INJECTION, SOLUTION INTRAVENOUS; SUBCUTANEOUS at 08:35

## 2019-10-08 NOTE — PROGRESS NOTES
ARU/IPR REFERRAL CONTACT NOTE  [id number] [facility]    Re: Hever WALLACE    Follow up on [address] for [address]. Pt positive for left occipital lobe infarction. PT/OT/ST evals complete. No ST need; modified independent/cga with mobility and ranges from setup to max assist with adl's. Patient requiring assistance with adl's prior to admit. PT and OT recommending home with home care at this time. Good progress with PT; needs ongoing OT. Concur with recommendation of home with home care. Please re-consult should there be a decline in mobility. Thank you for the consult.     Rosibel Frey

## 2019-10-08 NOTE — PROGRESS NOTES
Problem: Discharge Planning  Goal: *Discharge to safe environment  Outcome: Resolved/Met    Home with home health    Pt to discharge to home via [address] medical transport @ 1400. Spoke with pt's son, Yulissa Estrada, made him aware of above, he's agreeable. Made him aware of orders for home health, agreeable, states [facility]. [facility] & [address] Provider list has been given to the patient and/or patient representative. Patient and/or patient representative has signed the [place] of Choice selecting [facility] as their preference agency and a copy given. Both Home Health Provider list and Freedom of Choice have been placed on the chart. Nursing made aware of above. Available as needed. Susan Olivarez RN,ext [phone].       Care Management Interventions  PCP Verified by CM: Yes(Dr Rebel Hawkins with visiting physicians)  Mode of Transport at Discharge: BLS  Transition of Care Consult (CM Consult): [address]: Yes  Discharge Durable Medical Equipment: No  Physical Therapy Consult: Yes  Occupational Therapy Consult: Yes  Speech Therapy Consult: No  Current Support Network: Relative's Home  Confirm Follow Up Transport: Family  Plan discussed with Pt/Family/Caregiver: Yes  Freedom of Choice Offered: Yes  Discharge Location  Discharge Placement: Home with home health([facility])

## 2019-10-08 NOTE — PROGRESS NOTES
Teleneurology Follow up Note      Atlee [place]ousas  [id number]    Subjective:     Pt presented with confusion for the past few days and CT head on admission showed left occipital infarction. MRI confirms the left occipital infarction. Neurology was asked for opinion on antiplatelets. Review of Systems:  Pertinent items are noted in HPI. Objective:      Visit Vitals  /75   Pulse 70   Temp 97.6 °F (36.4 °C)   Resp 17   Ht 5' 4\" (1.626 m)   Wt 103.9 kg (229 lb)   SpO2 98%   BMI 39.31 kg/m²       Physical Exam:  General:  Well defined, nourished, and groomed individual in no acute distress. NEUROLOGICAL EXAMINATION:     Mental Status:   Alert and oriented to person, place, and time with recent and remote memory intact. Attention span and concentration are normal. Speech is fluent with a full fund of knowledge. Cranial Nerves:    II, III, IV, VI:  PERLL, Right visual field cut to confrontation. Extra-ocular movements are full. V-XII: Hearing is grossly intact. Facial features are symmetric, with normal sensation and strength. The palate rises symmetrically and the tongue protrudes midline. Sternocleidomastoids 5/5. Motor Examination: No drift was noted throughout. No involuntary movements. Sensory exam:  Normal throughout to light touch per tele-presenter except in right leg with slight deficit. Coordination:  Heel-to-shin was smooth and symmetrical bilaterally. Finger to nose was normal.       NIHSS: 3    Lab/Data Reviewed:    Medications Reviewed:    Imaging Reviewed:      Assessment/Plan     Principal Problem:    Acute encephalopathy (10/5/2019)    Active Problems:    UTI (urinary tract infection) (12/30/2013)      Hypokalemia (10/5/2019)      Diabetes mellitus type 2, controlled ([facility] [address] (10/5/2019)      TIA (transient ischemic attack) (10/5/2019)      Obesity (10/5/2019)      80years old female with history of DM and hypertension presented with confusion.  Found to have UTI and CT showed left occipital infarction. Likely stroke happened more than 5 days ago. On my review of images there is minimal petechial hemorrhage and nothing significant. No randy hematoma was noted and no micro-hemorrhages were noted. 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 My concern is for left PCA stroke extending and involving the midbrain. 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 Therefore I think there is more benefit from DAPT than risks. No family members at bedside and we discussed the risk of hemorrhage intracranially as well as GI with DAPT. She understands these risks. Hospitalist team to discuss these risks with family of the patient as well. Agree with full dose Lipitor. Also recommend 30 days event monitor provided she has left atrial enlargement on TTE. PT/OT evaluation and treatment is recommended. Please feel free to call with questions.     Mona Byoce MD  10/8/2019

## 2019-10-08 NOTE — PROGRESS NOTES
Called pt son, Pau Taryn and reviewed discharge instructions on behalf of primary nurse Alaina Thomason. Instructions printed and provided in pt belongings for pt son to see. Reviewed information with pt as well. Peripheral iv removed and pt dressed. Prescriptions for lipitor, cephalexin, and plavix were placed in pt belongings bag. Pt son declined to have prescriptions filled at the [address] due to $12.20 copay and would rather have prescriptions filled at there [facility]. Stroke Education provided to patient and relative(s) and the following topics were discussed    1. Patients personal risk factors for stroke are hypertension and diabetes mellitus    2. Warning signs of Stroke:        * Sudden numbness or weakness of the face, arm or leg, especially on one side of          The body            * Sudden confusion, trouble speaking or understanding        * Sudden trouble seeing in one or both eyes        * Sudden trouble walking, dizziness, loss of balance or coordination        * Sudden severe headache with no known cause      3. Importance of activation Emergency Medical Services ( 9-1-1 ) immediately if experience any warning signs of stroke. 4. Be sure and schedule a follow-up appointment with your primary care doctor or any specialists as instructed. 5. You must take medicine every day to treat your risk factors for stroke. Be sure to take your medicines exactly as your doctor tells you: no more, no less. Know what your medicines are for , what they do. Anti-thrombotics /anticoagulants can help prevent strokes. You are taking the following medicine(s)  plavix and aspirin     6. Smoking and second-hand smoke greatly increase your risk of stroke, cardiovascular disease and death. Smoking history never    7. Information provided was [facility] Stroke Education Binder, Stroke Handouts or Verbal Education    8.  Documentation of teaching completed in Patient Education Activity and on Care Plan with teaching response noted?   yes

## 2019-10-08 NOTE — DISCHARGE INSTRUCTIONS
TIA: After Your Visit     Your Care Instructions     You have had a TIA. Risk factors for stroke include being overweight, smoking, and sedentary lifestyle. This means that the blood flow to a part of your brain was blocked for some time, which damages the nerve cells in that part of the brain. The part of your body controlled by that part of your brain may not function properly now. The brain is an amazing organ that can heal itself to some degree. The stroke you had damaged part of your brain, but other parts of your brain may take over in some way for the damaged areas. You have already started this process. Going home may be hard for you and your family. The more you can try to do for yourself, the better. Remember to take each day one at a time. Follow-up care is a key part of your treatment and safety. Be sure to make and go to all appointments, and call your doctor if you are having problems. Its also a good idea to know your test results and keep a list of the medicines you take. How can you care for yourself at home? Enter a stroke rehabilitation (rehab) program, if your doctor recommends it. Physical, speech, and occupational therapies can help you manage bathing, dressing, eating, and other basics of daily living. Eat a heart-healthy diet that is low in cholesterol, saturated fat, and salt. Eat lots of fresh fruits and vegetables and foods high in fiber. Increase your activities slowly. Take short rest breaks when you get tired. Gradually increase the amount you walk. Start out by walking a little more than you did the day before. Do not drive until your doctor says it is okay. It is normal to feel sad or depressed after a stroke. If the blues last, talk to your doctor. If you are having problems with urine leakage, go to the bathroom at regular times, including when you first wake up and at bedtime. Also, limit fluids after dinner.   If you are constipated, drink plenty of fluids, enough so that your urine is light yellow or clear like water. If you have kidney, heart, or liver disease and have to limit fluids, talk with your doctor before you increase the amount of fluids you drink. Set up a regular time for using the toilet. If you continue to have constipation, your doctor may suggest using a bulking agent, such as Metamucil, or a stool softener, laxative, or enema. Medicines  Take your medicines exactly as prescribed. Call your doctor if you think you are having a problem with your medicine. You may be taking several medicines. ACE (angiotensin-converting enzyme) inhibitors, angiotensin II receptor blockers (ARBs), beta-blockers, diuretics (water pills), and calcium channel blockers control your blood pressure. Statins help lower cholesterol. Your doctor may also prescribe medicines for depression, pain, sleep problems, anxiety, or agitation. If your doctor has given you medicine that prevents blood clots, such as warfarin (Coumadin), aspirin combined with extended-release dipyridamole (Aggrenox), clopidogrel (Plavix), or aspirin to prevent another stroke, you should:  Tell your dentist, pharmacist, and other health professionals that you take these medicines. Watch for unusual bruising or bleeding, such as blood in your urine, red or black stools, or bleeding from your nose or gums. Get regular blood tests to check your clotting time if you are taking Coumadin. Wear medical alert jewelry that says you take blood thinners. You can buy this at most drugstores. Do not take any over-the-counter medicines or herbal products without talking to your doctor first.  If you take birth control pills or hormone replacement therapy, talk to your doctor about whether they are right for you. For family members and caregivers  Make the home safe. Set up a room so that your loved one does not have to climb stairs. Be sure the bathroom is on the same floor.  Move throw rugs and furniture that could cause falls, and make sure that the lighting is good. Put grab bars and seats in tubs and showers. Find out what your loved one can do and what he or she needs help with. Try not to do things for your loved one that your loved one can do on his or her own. Help him or her learn and practice new skills. Visit and talk with your loved one often. Try doing activities together that you both enjoy, such as playing cards or board games. Keep in touch with your loved one's friends as much as you can, and encourage them to visit. Take care of yourself. Do not try to do everything yourself. Ask other family members to help. Eat well, get enough rest, and take time to do things that you enjoy. Keep up with your own doctor visits, and make sure to take your medicines regularly. Get out of the house as much as you can. Join a local support group. Find out if you qualify for home health care visits to help with rehab or for adult day care. When should you call for help? Call 911 anytime you think you may need emergency care. For example, call if:  You have signs of another stroke. These may include:  Sudden numbness, paralysis, or weakness in your face, arm, or leg, especially on only one side of your body. New problems with walking or balance. Sudden vision changes. Drooling or slurred speech. New problems speaking or understanding simple statements, or you feel confused. A sudden, severe headache that is different from past headaches. Call 911 even if these symptoms go away in a few minutes. You cough up blood. You vomit blood or what looks like coffee grounds. You pass maroon or very bloody stools. Call your doctor now or seek immediate medical care if:  You have new bruises or blood spots under your skin. You have a nosebleed. Your gums bleed when you brush your teeth. You have blood in your urine. Your stools are black and tarlike or have streaks of blood.   You have vaginal bleeding when you are not having your period, or heavy period bleeding. You have new symptoms that may be related to your stroke, such as falls or trouble swallowing. Watch closely for changes in your health, and be sure to contact your doctor if you have any problems. Where can you learn more? Go to [employer]    Enter C294  in the search box to learn more about \"Stroke: After Your Visit\". © 7433-3059 [employer]wise, [employer]. Care instructions adapted under license by [employer] (which disclaims liability or warranty for this information). This care instruction is for use with your licensed healthcare professional. If you have questions about a medical condition or this instruction, always ask your healthcare professional. Julien Dakin any warranty or liability for your use of this information. DISCHARGE SUMMARY from Nurse    PATIENT INSTRUCTIONS:    After general anesthesia or intravenous sedation, for 24 hours or while taking prescription Narcotics:  · Limit your activities  · Do not drive and operate hazardous machinery  · Do not make important personal or business decisions  · Do  not drink alcoholic beverages  · If you have not urinated within 8 hours after discharge, please contact your surgeon on call. Report the following to your surgeon:  · Excessive pain, swelling, redness or odor of or around the surgical area  · Temperature over 100.5  · Nausea and vomiting lasting longer than 4 hours or if unable to take medications  · Any signs of decreased circulation or nerve impairment to extremity: change in color, persistent  numbness, tingling, coldness or increase pain  · Any questions    What to do at Home:  Recommended activity: Activity as tolerated    If you experience any of the following symptoms increased confusion, fever, or chills, please follow up with primary care physician.     *  Please give a list of your current medications to your Primary Care Provider. *  Please update this list whenever your medications are discontinued, doses are      changed, or new medications (including over-the-counter products) are added. *  Please carry medication information at all times in case of emergency situations. These are general instructions for a healthy lifestyle:    No smoking/ No tobacco products/ Avoid exposure to second hand smoke  Surgeon General's Warning:  Quitting smoking now greatly reduces serious risk to your health. Obesity, smoking, and sedentary lifestyle greatly increases your risk for illness    A healthy diet, regular physical exercise & weight monitoring are important for maintaining a healthy lifestyle    You may be retaining fluid if you have a history of heart failure or if you experience any of the following symptoms:  Weight gain of 3 pounds or more overnight or 5 pounds in a week, increased swelling in our hands or feet or shortness of breath while lying flat in bed. Please call your doctor as soon as you notice any of these symptoms; do not wait until your next office visit. The discharge information has been reviewed with the patient. The patient verbalized understanding. Discharge medications reviewed with the patient and appropriate educational materials and side effects teaching were provided.     Patient armband removed and shredded

## 2019-10-08 NOTE — ANCILLARY DISCHARGE INSTRUCTIONS
Patient and/or next of kin has been given the [facility] Important Message From Medicare About Your Rights\" letter and all questions were answered.

## 2019-10-08 NOTE — PROGRESS NOTES
0700 Verbal report given by off going RN. Pt resting comfortably in bed at this time. No needs voiced. Call light within reach    0830 NIH unchanged. Assessment unremarkable. Will continue to monitor    1300 Pt comfortable. Pending discharge. Assessment unchanged. Will continue to monitor. 1430 Transport here to take pt home. Discharge criteria met and pt discharged by ANANYA Chapman.

## 2019-10-08 NOTE — DISCHARGE SUMMARY
Discharge Summary    Patient: Ulysses Avitia               Sex: female          DOA: 10/5/2019       YOB: 1923      Age:  80 y.o.        LOS:  LOS: 3 days                Admit Date: 10/5/2019    Discharge Date: 10/8/2019    Admission Diagnoses: TIA (transient ischemic attack) [G45.9]    Discharge Diagnoses:    Hospital Problems  Date Reviewed: 1/1/2014          Codes Class Noted POA    Hypokalemia ICD-10-CM: E87.6  ICD-9-CM: 276.8  10/5/2019 Unknown        * (Principal) Acute encephalopathy ICD-10-CM: G93.40  ICD-9-CM: 348.30  10/5/2019 Unknown        Diabetes mellitus type 2, controlled ([facility] [address] ICD-10-CM: E11.9  ICD-9-CM: 250.00  10/5/2019 Unknown        TIA (transient ischemic attack) ICD-10-CM: G45.9  ICD-9-CM: 435.9  10/5/2019 Unknown        Obesity ICD-10-CM: E66.9  ICD-9-CM: 278.00  10/5/2019 Unknown        UTI (urinary tract infection) ICD-10-CM: N39.0  ICD-9-CM: 599.0  12/30/2013 Unknown              Discharge Disposition: Home or Self Care     Discharge Condition:  Improved    Hospital Course:  80F with h/o DM who presented with AMS. Workup was positive for UTI and acute stroke. MRI showed acute infarct involving the left occipital lobe. MRA neck showed severe stenosis and near occlusion of mid left PCA correlating with acute left occipital lobe infarct seen on MRI brain. TeleNeurology was consulted and per Dr. Eleazar Aly, he recommended ASA 81mg and Plavix 75mg for 30 days followed by Plavix 75mg only after that. Discussed with family who were in agreement. TeleNeuro also recommended 30 day event monitor, for which she will need to follow-up with Cardiology, message sent to Cardiology to schedule clinic appointment. She was also found to have a Proteus UTI. She was treated with rocephin and given Rx for Ancef on discharge. She improved clinically. She was discharged to home in stable condition.     Consults:    Neurology    Labs:  Labs: Results:       Chemistry Recent Labs     10/07/19  0130 10/06/19  0434 10/05/19  1330   *  --  82     --  137   K 3.8 4.0 3.2*     --  99*   CO2 25  --  30   BUN 16  --  23*   CREA 1.00  --  1.32*   CA 8.9  --  9.2   AGAP 10  --  8   BUCR 16  --  17   AP  --   --  94   TP  --   --  8.1   ALB  --   --  3.1*   GLOB  --   --  5.0*   AGRAT  --   --  0.6*      CBC w/Diff Recent Labs     10/07/19  0130 10/06/19  0434 10/05/19  1330   WBC 11.5 13.3* 14.9*   RBC 3.90* 3.99* 4.21   HGB 11.4* 11.8* 12.6   HCT 36.0 37.2 39.2    204 217   GRANS 67  --  68   LYMPH 24  --  25   EOS 2  --  1      Cardiac Enzymes No results for input(s): CPK, CKND1, OLIVER in the last 72 hours. No lab exists for component: CKRMB, TROIP   Coagulation Recent Labs     10/07/19  0130   PTP 13.5   INR 1.1       Lipid Panel Lab Results   Component Value Date/Time    Cholesterol, total 164 10/06/2019 04:34 AM    HDL Cholesterol 44 10/06/2019 04:34 AM    LDL, calculated 84.6 10/06/2019 04:34 AM    VLDL, calculated 35.4 10/06/2019 04:34 AM    Triglyceride 177 (H) 10/06/2019 04:34 AM    CHOL/HDL Ratio 3.7 10/06/2019 04:34 AM      BNP No results for input(s): BNPP in the last 72 hours. Liver Enzymes Recent Labs     10/05/19  1330   TP 8.1   ALB 3.1*   AP 94   SGOT 15      Thyroid Studies Lab Results   Component Value Date/Time    TSH 2.57 10/06/2019 04:34 AM            Significant Diagnostic Studies:   Mra Brain Wo Cont    Result Date: 10/6/2019  EXAM: MRA HEAD INDICATION: Confusion, weakness COMPARISON: No prior study TECHNIQUE:  MR angiography of the head was performed utilizing 3-D time of flight axial acquisitions with multiplanar reconstructions. _______________________ FINDINGS:  There is no evidence of aneurysm. Normal variant tortuosity is present involving the bilateral distal ICA cervical segments below the level of the skull base without high-grade stenosis. Severe focal stenosis is suggested along the proximal left ICA cavernous segment.  More distally left carotid siphon is slightly small in size in comparison to the right and appears to have diminished signal intensity suggesting this stenosis is flow-limiting. The left carotid terminus is otherwise unremarkable. Moderate focal stenosis is present involving the junction of the right ICA petrous and cavernous segments. More distally the right carotid siphon is unremarkable. Right carotid terminus is unremarkable. No focal anterior cerebral artery stenosis is seen. Left A1 segment is quite hypoplastic appearing. Dominant widely patent right A1 segment is present. An anterior communicating artery is present with relatively equal sized A2 segments. Mild irregularity seen along distal STEPHANI mostly pericallosal region. Moderate irregularity is present at the right MCA bifurcation. There is moderate focal stenosis involving proximal left M1 segment near the carotid terminus. Right vertebral artery is larger than the left. PICA origins are unremarkable. Basilar artery is overall small in size with mild irregularity along its mid aspect. Bilateral P1 segments are hypoplastic appearing. Moderate irregularity is present in the right mid PCA. Severe stenosis with relative occlusion is present in the mid left P2 segment. Minimal distal branch flow is present. A posterior communicating artery is present bilaterally. _______________________     IMPRESSION: 1. Severe stenosis and near occlusion of mid left PCA correlating with acute left occipital lobe infarct seen on accompanying MR brain. Minimal distal left MCA branch flow is present. 2. Severe focal stenosis involving the left ICA cavernous segment with diminished size of distal left ICA and decreased signal intensity suggesting flow limiting stenosis. 3. Moderate focal stenosis involving the right carotid siphon and junction of petrous-cavernous segments.  4. Additional mild to moderate irregularity involving right PCA, bilateral MCA, and distal STEPHANI branches , as described in detail in Findings section. 5. No intracranial aneurysm is seen. Mra Neck W Wo Cont    Result Date: 10/6/2019  EXAM: MRA NECK W/ AND W/O CONTRAST INDICATION: Confusion, weakness COMPARISON: No prior study TECHNIQUE:  MR angiography of the neck was performed utilizing contrast enhanced coronal acquisitions with multiplanar reconstructions. Additional axial 2-D and 3-D time-of-flight non-contrast imaging was also acquired. _______________________ FINDINGS: Study is degraded by motion artifact. Portions of the vertebral arteries are not included in field-of-view on postcontrast imaging. The left carotid artery bifurcation is moderately irregular, particularly along the bifurcation itself the lumen narrowed to approximately 2 mm, versus more normal-appearing left common carotid artery diameter 4.2 mm (estimated 53% stenosis). Estimated minimal luminal diameter of proximal ICA is 3.1 mm. Estimated diameter of normal distal cervical segment ICA is 4.6 mm. Estimated stenosis of left ICA based upon NASCET criteria is 33%. Additional irregularity is present along the proximal cervical segment with possible superior projecting 2 mm outpouching, perhaps sequela of prior dissection with pseudoaneurysm; no adjacent significant luminal narrowing or intimal flap is seen. Of note, severe focal stenosis suggested involving the left cavernous segment along the siphon. The right carotid artery bifurcation is mildly irregular. Estimated minimal luminal diameter of proximal ICA is 3.8 mm. Estimated diameter of normal distal cervical segment ICA is 5.2 mm. Estimated stenosis of right ICA based upon NASCET criteria is 27%. Mild to moderate narrowing is present at the skull base entrance. Moderate to severe stenosis is present along the junction of the petrous and cavernous segments. Origins of the great vessels along aortic arch are incompletely included in field-of-view on postcontrast imaging, at least mildly irregular.  No high-grade great vessel origin stenosis is seen on noncontrast time-of-flight imaging. Right vertebral artery slightly larger than the left. Tortuosity along the vertebral arteries is present particularly the V2 segments. Courses of the vertebral arteries are adequately included in field-of-view on postcontrast imaging. Motion artifact degrades vertebral artery origin evaluation not contrast time-of-flight imaging. No definite high grade vertebral artery stenosis is seen. Bilateral antegrade flow seen on non-contrast time of flight images. _______________________     IMPRESSION: 1. Mild to moderate proximal ICA irregularity with estimated 27% stenosis on the right and 33% stenosis on the left, based on NASCET criteria. 2. 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 3. No high-grade vertebral artery stenosis is seen given limitations , as described in detail in Findings section. 4. Please see separate MRA head report for further details regarding carotid siphon high-grade stenosis. Mri Brain Wo Cont    Result Date: 10/6/2019  EXAM: MRI BRAIN W/O CONTRAST INDICATION: Confusion, weakness COMPARISON: No prior study TECHNIQUE: Multiplanar multi sequence MR imaging of the brain was performed utilizing axial T2, FLAIR, diffusion, coronal T2, and multiplanar T1 pre contrast imaging . Additional dedicated susceptibility weighted imaging was performed including MIP and filtered phase reconstruction images. No gadolinium was administered due to specific clinician request.  Imaging performed on wide bore Discovery [id number] [employer] suite 3T magnet at [facility]. _______________________ FINDINGS: VENTRICLES/EXTRA-AXIAL SPACES: The ventricles and sulci are mildly enlarged consistent with diffuse volume loss.  Small discrete focal E millimeters right lateral posterior frontal extra-axial focus of decreased T1 and T2 signal with susceptibility artifact is present correlating with thickened calcification on prior CT. No adjacent mass effect or parenchymal edema is seen. BRAIN PARENCHYMA: Localized abnormal restricted diffusion is present involving the left occipital lobe, confluent along its posterior medial aspect with additional patchy abnormal restricted signal more anteriorly. Associated T2/FLAIR hyperintensity is present. Very mild local sulcal effacement is seen. Very mild gyriformal susceptibility artifact is present compatible with petechial hemorrhage. Mild to moderate amount of T2/FLAIR hyperintense white matter lesions are seen within the periventricular and subcortical white matter , including patchy abnormal signal in the brainstem, which are nonspecific, but likely represent chronic small vessel changes. Small chronic right cerebellar hemisphere central white matter infarct is present. Chronic infarct is present in the left paramedian mid issa. Chronic posterior left thalamic infarct is present. No midline shift or herniation is present. VASCULATURE:  The major intracranial vascular flow voids are grossly normal. Overall small size vertebrobasilar arteries are present. ORBITS: The bilateral lenses are absent, likely due to prior lens replacement. PARANASAL SINUSES: Very mild mucoperiosteal thickening is scattered in the paranasal sinuses. No air-fluid levels are present. MASTOID AIR CELLS: Minimal  fluid signal is present in the left-sided  mastoid air cells, nonspecific but likely inflammatory. Visualized posterior nasopharynx is unremarkable. OSSEOUS STRUCTURES: Degenerative changes are seen in visualized upper cervical spine. OTHER: None.  ________________________     IMPRESSION: 1. Acute infarct involving the left occipital lobe. Very mild amount of associated petechial hemorrhage in local sulcal effacement. No midline shift.  2. Mild to moderate nonspecific white matter disease likely representing chronic small vessel changes. 3. Small chronic infarcts involving right cerebellar white matter, all left paramedian issa, posterior left thalamus. Ct Head Wo Cont    Result Date: 10/5/2019  EXAM: CT head CLINICAL INDICATION/HISTORY: Confusion and weakness. COMPARISON: 08/16/2016 TECHNIQUE: Axial CT imaging of the head was performed without intravenous contrast. One or more dose reduction techniques were used on this CT: automated exposure control, adjustment of the mAs and/or kVp according to patient size, and iterative reconstruction techniques. The specific techniques used on this CT exam have been documented in the patient's electronic medical record. Digital Imaging and Communications in Medicine (DICOM) format image data are available to nonaffiliated external healthcare facilities or entities on a secure, media free, reciprocally searchable basis with patient authorization for at least a 12-month period after this study. _______________ FINDINGS: BRAIN AND POSTERIOR FOSSA: There is no intracranial hemorrhage, mass effect, or midline shift. When compared to the 2016 exam, there is new hypoattenuation involving the left occipital lobe. Additionally, there is a focus of hypoattenuation in the left anterior issa which is new from prior imaging. There is a mild degree of sulcal enlargement and ventricular dilatation consistent with diffuse volume loss. There is hypoattenuation of the periventricular white matter, which is nonspecific but most likely represents changes from chronic microangiopathy. Gray-white differentiation is maintained. EXTRA-AXIAL SPACES AND MENINGES: There are no abnormal extra-axial fluid collections. CALVARIUM: Intact. SINUSES: Clear. OTHER: None. _______________     IMPRESSION: 1. No intracranial hemorrhage, mass effect, or midline shift. 2. Age-indeterminate infarctions involving the left occipital lobe and left anterior issa. Given the degree of hypoattenuation, 3.  Senescent changes of the brain including diffuse volume loss and findings most suggestive of chronic microvascular ischemia. Findings discussed with Dr. Tanvi Power at 2:14 PM on 10/05/2019    Xr Chest Port    Result Date: 10/5/2019  EXAM: One view chest x-ray CLINICAL INDICATION/HISTORY: Weakness. COMPARISON: 06/07/2019. TECHNIQUE: Single AP view of the chest was obtained. _______________ FINDINGS: HEART, VESSELS, MEDIASTINUM: Heart size is normal. No vascular congestion. There is atherosclerosis of the thoracic aorta. LUNGS, PLEURAL SPACES: The lungs are clear. No effusion or pneumothorax. BONY THORAX, SOFT TISSUES: Unremarkable. _______________     IMPRESSION: No acute cardiopulmonary process. Discharge Medications:     Current Discharge Medication List      START taking these medications    Details   atorvastatin (LIPITOR) 80 mg tablet Take 1 Tab by mouth nightly. Qty: 30 Tab, Refills: 0      clopidogrel (PLAVIX) 75 mg tab Take 1 Tab by mouth daily. Qty: 30 Tab, Refills: 2      cephALEXin (KEFLEX) 500 mg capsule Take 1 Cap by mouth two (2) times a day for 6 days. Indications: UTI  Qty: 12 Cap, Refills: 0         CONTINUE these medications which have CHANGED    Details   aspirin 81 mg chewable tablet Take 1 Tab by mouth daily. TAKE IT FOR 30 DAYS AND THEN STOP. Qty: 30 Tab, Refills: 0         CONTINUE these medications which have NOT CHANGED    Details   !! lisinopril (PRINIVIL, ZESTRIL) 40 mg tablet Take 40 mg by mouth daily. ergocalciferol (VITAMIN D2) 50,000 unit capsule Take 50,000 Units by mouth every seven (7) days. furosemide (LASIX) 40 mg tablet Take 40 mg by mouth daily. insulin glargine (LANTUS SOLOSTAR U-100 INSULIN) 100 unit/mL (3 mL) inpn by SubCUTAneous route nightly. insulin lispro protamine/insulin lispro (HUMALOG MIX 75-25,U-100,INSULN) 100 unit/mL (75-25) injection 26 Units by SubCUTAneous route two (2) times a day. folic acid (FOLVITE) 1 mg tablet Take 1 mg by mouth daily.       ondansetron (ZOFRAN ODT) 4 mg disintegrating tablet Take 1 tablets every 6-8 hours as needed for nausea and vomiting. Qty: 10 Tab, Refills: 0      amLODIPine (NORVASC) 5 mg tablet Take 1 Tab by mouth daily. Indications: HYPERTENSION  Qty: 30 Tab, Refills: 0      !! lisinopril (PRINIVIL, ZESTRIL) 10 mg tablet Take 1 Tab by mouth daily. Qty: 30 Tab, Refills: 0      CYANOCOBALAMIN, VITAMIN B-12, (VITAMIN B-12 PO) Take  by mouth. CHOLECALCIFEROL, VITAMIN D3, (VITAMIN D3 PO) Take  by mouth. !! - Potential duplicate medications found. Please discuss with provider. Activity: Activity as tolerated    Diet: Cardiac Diet    Follow-up: PCP in 1 week. Cardiology in 1 week.          Total time spent including time spent on final examination and discharge discussion, discharge documentation and records reviewed and medication reconciliation: > 30 minutes    Jacinta Rios MD  [facility]

## 2019-10-08 NOTE — PROGRESS NOTES
Problem: Mobility Impaired (Adult and Pediatric)  Goal: *Acute Goals and Plan of Care (Insert Text)  Description  Physical Therapy Goals  Initiated 10/6/2019 and to be accomplished within 7 day(s)  1. Patient will move from supine to sit and sit to supine , scoot up and down and roll side to side in bed with modified independence. 2.  Patient will transfer from bed to chair and chair to bed with modified independence using the least restrictive device. 3.  Patient will perform sit to stand with modified independence. 4.  Patient will ambulate with modified independence for >/= 100 feet with the least restrictive device. 5.  Patient will ascend/descend 4 stairs with handrail(s) with supervision/set-up. Outcome: Progressing Towards Goal   PHYSICAL THERAPY TREATMENT    Patient: Veronica Lion (1 y.o. female)  Date: 10/8/2019  Diagnosis: TIA (transient ischemic attack) [G45.9] Acute encephalopathy       Precautions:    PLOF: with RW    ASSESSMENT:  Pt anxious, however performs all activity well today with encouragement. Educated pt on role of PT and importance of daily activity to maintain functional strength and mobility. Pt mod I for bed mobility, scooting seated EOB and within bed for postioning, CGA sit<>stand with verbal cues for hand placement for safety. Progression toward goals:   ?      Improving appropriately and progressing toward goals  ? Improving slowly and progressing toward goals  ? Not making progress toward goals and plan of care will be adjusted     PLAN:  Patient continues to benefit from skilled intervention to address the above impairments. Continue treatment per established plan of care. 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     OBJECTIVE DATA SUMMARY:   Critical Behavior:  Neurologic State: Alert  Orientation Level: Disoriented to time, Disoriented to situation, Oriented to person, Oriented to place  Cognition: Appropriate for age attention/concentration, Follows commands, Recognition of people/places  Safety/Judgement: Fall prevention  Functional Mobility Training:  Bed Mobility:  Rolling: Modified independent  Supine to Sit: Modified independent  Sit to Supine: Contact guard assistance  Transfers:  Sit to Stand: Contact guard assistance  Stand to Sit: Contact guard assistance  Balance:  Sitting: Intact  Standing: Impaired  Standing - Static: Fair  Standing - Dynamic : Fair     Ambulation/Gait Training:  Distance (ft): 6 Feet (ft)  Ambulation - Level of Assistance: Contact guard assistance  Gait Abnormalities: Decreased step clearance  Base of Support: Widened  Speed/Jocelyn: Pace decreased (<100 feet/min)  Step Length: Left shortened;Right shortened  Pain:  Pain level pre-treatment: 0/10  Pain level post-treatment: 0/10   Pain Intervention(s): n/a      Activity Tolerance:   Good   Please refer to the flowsheet for vital signs taken during this treatment. After treatment:   ? Patient left in no apparent distress sitting up in chair  ? Patient left in no apparent distress in bed  ? Call bell left within reach  ? Nursing notified  ? Caregiver present  ? Bed alarm activated  ? SCDs applied      COMMUNICATION/EDUCATION:   ?           ? Fall prevention education was provided and the patient/caregiver indicated understanding. ? Patient/family have participated as able in working toward goals and plan of care. ?         Patient/family agree to work toward stated goals and plan of care. ?         Patient understands intent and goals of therapy, but is neutral about his/her participation. ? Patient is unable to participate in stated goals/plan of care: ongoing with therapy staff. ?         Role of Physical Therapy in the acute care setting.         Mercedez Rice, PTA   Time Calculation: 12 mins

## 2019-10-09 ENCOUNTER — HOME CARE VISIT (OUTPATIENT)
Dept: HOME HEALTH SERVICES | Facility: HOME HEALTH | Age: 84
End: 2019-10-09

## 2019-10-09 ENCOUNTER — HOME CARE VISIT (OUTPATIENT)
Dept: SCHEDULING | Facility: HOME HEALTH | Age: 84
End: 2019-10-09
Payer: MEDICARE

## 2019-10-09 VITALS
DIASTOLIC BLOOD PRESSURE: 77 MMHG | HEART RATE: 68 BPM | TEMPERATURE: 97.3 F | SYSTOLIC BLOOD PRESSURE: 144 MMHG | OXYGEN SATURATION: 97 % | RESPIRATION RATE: 14 BRPM

## 2019-10-09 VITALS
HEART RATE: 68 BPM | DIASTOLIC BLOOD PRESSURE: 77 MMHG | SYSTOLIC BLOOD PRESSURE: 144 MMHG | TEMPERATURE: 97.3 F | OXYGEN SATURATION: 97 %

## 2019-10-09 PROCEDURE — 3331090002 HH PPS REVENUE DEBIT

## 2019-10-09 PROCEDURE — G0151 HHCP-SERV OF PT,EA 15 MIN: HCPCS

## 2019-10-09 PROCEDURE — 400013 HH SOC

## 2019-10-09 PROCEDURE — G0299 HHS/HOSPICE OF RN EA 15 MIN: HCPCS

## 2019-10-09 PROCEDURE — 3331090001 HH PPS REVENUE CREDIT

## 2019-10-10 ENCOUNTER — HOME CARE VISIT (OUTPATIENT)
Dept: HOME HEALTH SERVICES | Facility: HOME HEALTH | Age: 84
End: 2019-10-10
Payer: MEDICARE

## 2019-10-10 ENCOUNTER — HOME CARE VISIT (OUTPATIENT)
Dept: SCHEDULING | Facility: HOME HEALTH | Age: 84
End: 2019-10-10
Payer: MEDICARE

## 2019-10-10 VITALS
OXYGEN SATURATION: 95 % | HEART RATE: 71 BPM | TEMPERATURE: 98.2 F | DIASTOLIC BLOOD PRESSURE: 72 MMHG | SYSTOLIC BLOOD PRESSURE: 143 MMHG

## 2019-10-10 PROCEDURE — 3331090001 HH PPS REVENUE CREDIT

## 2019-10-10 PROCEDURE — 3331090002 HH PPS REVENUE DEBIT

## 2019-10-10 PROCEDURE — G0152 HHCP-SERV OF OT,EA 15 MIN: HCPCS

## 2019-10-11 ENCOUNTER — HOME CARE VISIT (OUTPATIENT)
Dept: SCHEDULING | Facility: HOME HEALTH | Age: 84
End: 2019-10-11
Payer: MEDICARE

## 2019-10-11 VITALS
TEMPERATURE: 98.1 F | SYSTOLIC BLOOD PRESSURE: 118 MMHG | DIASTOLIC BLOOD PRESSURE: 60 MMHG | HEART RATE: 70 BPM | OXYGEN SATURATION: 96 %

## 2019-10-11 VITALS
SYSTOLIC BLOOD PRESSURE: 135 MMHG | HEART RATE: 69 BPM | DIASTOLIC BLOOD PRESSURE: 70 MMHG | OXYGEN SATURATION: 94 % | TEMPERATURE: 98.3 F

## 2019-10-11 PROCEDURE — G0157 HHC PT ASSISTANT EA 15: HCPCS

## 2019-10-11 PROCEDURE — 3331090001 HH PPS REVENUE CREDIT

## 2019-10-11 PROCEDURE — 3331090002 HH PPS REVENUE DEBIT

## 2019-10-11 PROCEDURE — G0299 HHS/HOSPICE OF RN EA 15 MIN: HCPCS

## 2019-10-11 PROCEDURE — G0158 HHC OT ASSISTANT EA 15: HCPCS

## 2019-10-11 NOTE — CDMP QUERY
Per Progress notes and Dc summary  ,the patient is documented to have Encephalopathy , Uti Vs Cva  vs other. Ct of the head and Mri of the brain were done, Mental status improved with treatment. Can the patient's encephalopathy be further specified as: 
 
=>Metabolic Encephalopathy Sec to Uti 
=>Encephalopathy due to stroke =>Both 
=>other Explanation 
=> Clinically unable to determine Thank you, 
Asa Ramirez RN/CDI

## 2019-10-12 ENCOUNTER — HOME CARE VISIT (OUTPATIENT)
Dept: HOME HEALTH SERVICES | Facility: HOME HEALTH | Age: 84
End: 2019-10-12
Payer: MEDICARE

## 2019-10-12 VITALS
RESPIRATION RATE: 18 BRPM | DIASTOLIC BLOOD PRESSURE: 64 MMHG | OXYGEN SATURATION: 97 % | HEART RATE: 65 BPM | SYSTOLIC BLOOD PRESSURE: 118 MMHG | TEMPERATURE: 98.3 F

## 2019-10-12 PROCEDURE — 3331090002 HH PPS REVENUE DEBIT

## 2019-10-12 PROCEDURE — 3331090001 HH PPS REVENUE CREDIT

## 2019-10-13 PROCEDURE — 3331090001 HH PPS REVENUE CREDIT

## 2019-10-13 PROCEDURE — 3331090002 HH PPS REVENUE DEBIT

## 2019-10-14 ENCOUNTER — HOME CARE VISIT (OUTPATIENT)
Dept: SCHEDULING | Facility: HOME HEALTH | Age: 84
End: 2019-10-14
Payer: MEDICARE

## 2019-10-14 VITALS
SYSTOLIC BLOOD PRESSURE: 114 MMHG | OXYGEN SATURATION: 94 % | TEMPERATURE: 98.3 F | HEART RATE: 70 BPM | DIASTOLIC BLOOD PRESSURE: 70 MMHG

## 2019-10-14 VITALS
OXYGEN SATURATION: 94 % | TEMPERATURE: 97.8 F | SYSTOLIC BLOOD PRESSURE: 116 MMHG | HEART RATE: 70 BPM | DIASTOLIC BLOOD PRESSURE: 58 MMHG

## 2019-10-14 PROCEDURE — G0156 HHCP-SVS OF AIDE,EA 15 MIN: HCPCS

## 2019-10-14 PROCEDURE — 3331090002 HH PPS REVENUE DEBIT

## 2019-10-14 PROCEDURE — G0157 HHC PT ASSISTANT EA 15: HCPCS

## 2019-10-14 PROCEDURE — G0158 HHC OT ASSISTANT EA 15: HCPCS

## 2019-10-14 PROCEDURE — 3331090001 HH PPS REVENUE CREDIT

## 2019-10-15 ENCOUNTER — HOME CARE VISIT (OUTPATIENT)
Dept: SCHEDULING | Facility: HOME HEALTH | Age: 84
End: 2019-10-15
Payer: MEDICARE

## 2019-10-15 VITALS
HEART RATE: 61 BPM | OXYGEN SATURATION: 96 % | TEMPERATURE: 97.6 F | DIASTOLIC BLOOD PRESSURE: 60 MMHG | SYSTOLIC BLOOD PRESSURE: 116 MMHG

## 2019-10-15 PROCEDURE — 3331090001 HH PPS REVENUE CREDIT

## 2019-10-15 PROCEDURE — G0158 HHC OT ASSISTANT EA 15: HCPCS

## 2019-10-15 PROCEDURE — 3331090002 HH PPS REVENUE DEBIT

## 2019-10-16 ENCOUNTER — HOME CARE VISIT (OUTPATIENT)
Dept: SCHEDULING | Facility: HOME HEALTH | Age: 84
End: 2019-10-16
Payer: MEDICARE

## 2019-10-16 VITALS
SYSTOLIC BLOOD PRESSURE: 138 MMHG | OXYGEN SATURATION: 95 % | RESPIRATION RATE: 18 BRPM | DIASTOLIC BLOOD PRESSURE: 64 MMHG | TEMPERATURE: 97.9 F | HEART RATE: 76 BPM

## 2019-10-16 VITALS
TEMPERATURE: 98 F | SYSTOLIC BLOOD PRESSURE: 150 MMHG | DIASTOLIC BLOOD PRESSURE: 78 MMHG | HEART RATE: 62 BPM | OXYGEN SATURATION: 95 %

## 2019-10-16 PROCEDURE — 3331090002 HH PPS REVENUE DEBIT

## 2019-10-16 PROCEDURE — G0157 HHC PT ASSISTANT EA 15: HCPCS

## 2019-10-16 PROCEDURE — G0496 LPN CARE TRAIN/EDU IN HH: HCPCS

## 2019-10-16 PROCEDURE — 3331090001 HH PPS REVENUE CREDIT

## 2019-10-17 ENCOUNTER — HOME CARE VISIT (OUTPATIENT)
Dept: SCHEDULING | Facility: HOME HEALTH | Age: 84
End: 2019-10-17
Payer: MEDICARE

## 2019-10-17 PROCEDURE — 3331090001 HH PPS REVENUE CREDIT

## 2019-10-17 PROCEDURE — 3331090002 HH PPS REVENUE DEBIT

## 2019-10-17 PROCEDURE — G0156 HHCP-SVS OF AIDE,EA 15 MIN: HCPCS

## 2019-10-18 ENCOUNTER — HOME CARE VISIT (OUTPATIENT)
Dept: SCHEDULING | Facility: HOME HEALTH | Age: 84
End: 2019-10-18
Payer: MEDICARE

## 2019-10-18 PROCEDURE — 3331090002 HH PPS REVENUE DEBIT

## 2019-10-18 PROCEDURE — G0151 HHCP-SERV OF PT,EA 15 MIN: HCPCS

## 2019-10-18 PROCEDURE — 3331090001 HH PPS REVENUE CREDIT

## 2019-10-19 ENCOUNTER — HOME CARE VISIT (OUTPATIENT)
Dept: HOME HEALTH SERVICES | Facility: HOME HEALTH | Age: 84
End: 2019-10-19
Payer: MEDICARE

## 2019-10-19 PROCEDURE — 3331090002 HH PPS REVENUE DEBIT

## 2019-10-19 PROCEDURE — 3331090001 HH PPS REVENUE CREDIT

## 2019-10-20 PROCEDURE — 3331090002 HH PPS REVENUE DEBIT

## 2019-10-20 PROCEDURE — 3331090001 HH PPS REVENUE CREDIT

## 2019-10-21 ENCOUNTER — HOME CARE VISIT (OUTPATIENT)
Dept: SCHEDULING | Facility: HOME HEALTH | Age: 84
End: 2019-10-21
Payer: MEDICARE

## 2019-10-21 VITALS
HEART RATE: 65 BPM | DIASTOLIC BLOOD PRESSURE: 75 MMHG | TEMPERATURE: 98 F | OXYGEN SATURATION: 96 % | SYSTOLIC BLOOD PRESSURE: 158 MMHG

## 2019-10-21 PROCEDURE — 3331090002 HH PPS REVENUE DEBIT

## 2019-10-21 PROCEDURE — G0157 HHC PT ASSISTANT EA 15: HCPCS

## 2019-10-21 PROCEDURE — 3331090001 HH PPS REVENUE CREDIT

## 2019-10-22 ENCOUNTER — HOME CARE VISIT (OUTPATIENT)
Dept: SCHEDULING | Facility: HOME HEALTH | Age: 84
End: 2019-10-22
Payer: MEDICARE

## 2019-10-22 VITALS
TEMPERATURE: 97.7 F | OXYGEN SATURATION: 96 % | DIASTOLIC BLOOD PRESSURE: 80 MMHG | SYSTOLIC BLOOD PRESSURE: 140 MMHG | HEART RATE: 61 BPM

## 2019-10-22 VITALS
SYSTOLIC BLOOD PRESSURE: 118 MMHG | OXYGEN SATURATION: 97 % | DIASTOLIC BLOOD PRESSURE: 59 MMHG | HEART RATE: 60 BPM | TEMPERATURE: 97.8 F

## 2019-10-22 PROCEDURE — 3331090001 HH PPS REVENUE CREDIT

## 2019-10-22 PROCEDURE — G0158 HHC OT ASSISTANT EA 15: HCPCS

## 2019-10-22 PROCEDURE — 3331090002 HH PPS REVENUE DEBIT

## 2019-10-23 ENCOUNTER — HOME CARE VISIT (OUTPATIENT)
Dept: SCHEDULING | Facility: HOME HEALTH | Age: 84
End: 2019-10-23
Payer: MEDICARE

## 2019-10-23 PROCEDURE — G0151 HHCP-SERV OF PT,EA 15 MIN: HCPCS

## 2019-10-23 PROCEDURE — 3331090002 HH PPS REVENUE DEBIT

## 2019-10-23 PROCEDURE — 3331090001 HH PPS REVENUE CREDIT

## 2019-10-24 ENCOUNTER — HOME CARE VISIT (OUTPATIENT)
Dept: SCHEDULING | Facility: HOME HEALTH | Age: 84
End: 2019-10-24
Payer: MEDICARE

## 2019-10-24 VITALS
HEART RATE: 70 BPM | DIASTOLIC BLOOD PRESSURE: 84 MMHG | OXYGEN SATURATION: 97 % | TEMPERATURE: 98.3 F | SYSTOLIC BLOOD PRESSURE: 148 MMHG

## 2019-10-24 PROCEDURE — 3331090002 HH PPS REVENUE DEBIT

## 2019-10-24 PROCEDURE — G0152 HHCP-SERV OF OT,EA 15 MIN: HCPCS

## 2019-10-24 PROCEDURE — 3331090001 HH PPS REVENUE CREDIT

## 2019-10-25 ENCOUNTER — HOME CARE VISIT (OUTPATIENT)
Dept: SCHEDULING | Facility: HOME HEALTH | Age: 84
End: 2019-10-25
Payer: MEDICARE

## 2019-10-25 VITALS
DIASTOLIC BLOOD PRESSURE: 64 MMHG | SYSTOLIC BLOOD PRESSURE: 118 MMHG | TEMPERATURE: 97.5 F | OXYGEN SATURATION: 95 % | HEART RATE: 64 BPM

## 2019-10-25 PROCEDURE — 3331090002 HH PPS REVENUE DEBIT

## 2019-10-25 PROCEDURE — G0156 HHCP-SVS OF AIDE,EA 15 MIN: HCPCS

## 2019-10-25 PROCEDURE — 3331090001 HH PPS REVENUE CREDIT

## 2019-10-26 PROCEDURE — 3331090001 HH PPS REVENUE CREDIT

## 2019-10-26 PROCEDURE — 3331090002 HH PPS REVENUE DEBIT

## 2019-10-27 PROCEDURE — 3331090002 HH PPS REVENUE DEBIT

## 2019-10-27 PROCEDURE — 3331090001 HH PPS REVENUE CREDIT

## 2019-10-28 ENCOUNTER — HOME CARE VISIT (OUTPATIENT)
Dept: SCHEDULING | Facility: HOME HEALTH | Age: 84
End: 2019-10-28
Payer: MEDICARE

## 2019-10-28 PROCEDURE — 3331090002 HH PPS REVENUE DEBIT

## 2019-10-28 PROCEDURE — 3331090001 HH PPS REVENUE CREDIT

## 2019-10-28 PROCEDURE — G0156 HHCP-SVS OF AIDE,EA 15 MIN: HCPCS

## 2019-10-29 PROCEDURE — 3331090001 HH PPS REVENUE CREDIT

## 2019-10-29 PROCEDURE — 3331090002 HH PPS REVENUE DEBIT

## 2019-10-30 PROCEDURE — 3331090001 HH PPS REVENUE CREDIT

## 2019-10-30 PROCEDURE — 3331090002 HH PPS REVENUE DEBIT

## 2019-10-31 ENCOUNTER — HOME CARE VISIT (OUTPATIENT)
Dept: SCHEDULING | Facility: HOME HEALTH | Age: 84
End: 2019-10-31
Payer: MEDICARE

## 2019-10-31 PROCEDURE — 3331090001 HH PPS REVENUE CREDIT

## 2019-10-31 PROCEDURE — 3331090002 HH PPS REVENUE DEBIT

## 2019-10-31 PROCEDURE — G0156 HHCP-SVS OF AIDE,EA 15 MIN: HCPCS

## 2019-11-01 PROCEDURE — 3331090002 HH PPS REVENUE DEBIT

## 2019-11-01 PROCEDURE — 3331090001 HH PPS REVENUE CREDIT

## 2019-11-02 PROCEDURE — 3331090001 HH PPS REVENUE CREDIT

## 2019-11-02 PROCEDURE — 3331090002 HH PPS REVENUE DEBIT

## 2019-11-03 PROCEDURE — 3331090002 HH PPS REVENUE DEBIT

## 2019-11-03 PROCEDURE — 3331090001 HH PPS REVENUE CREDIT

## 2019-11-04 ENCOUNTER — HOME CARE VISIT (OUTPATIENT)
Dept: HOME HEALTH SERVICES | Facility: HOME HEALTH | Age: 84
End: 2019-11-04
Payer: MEDICARE

## 2019-11-04 ENCOUNTER — HOME CARE VISIT (OUTPATIENT)
Dept: SCHEDULING | Facility: HOME HEALTH | Age: 84
End: 2019-11-04
Payer: MEDICARE

## 2019-11-04 PROCEDURE — G0156 HHCP-SVS OF AIDE,EA 15 MIN: HCPCS

## 2019-11-04 PROCEDURE — 3331090002 HH PPS REVENUE DEBIT

## 2019-11-04 PROCEDURE — 3331090001 HH PPS REVENUE CREDIT

## 2019-11-05 PROCEDURE — 3331090001 HH PPS REVENUE CREDIT

## 2019-11-05 PROCEDURE — 3331090002 HH PPS REVENUE DEBIT

## 2019-11-06 PROCEDURE — 3331090002 HH PPS REVENUE DEBIT

## 2019-11-06 PROCEDURE — 3331090001 HH PPS REVENUE CREDIT

## 2019-11-07 ENCOUNTER — HOME CARE VISIT (OUTPATIENT)
Dept: SCHEDULING | Facility: HOME HEALTH | Age: 84
End: 2019-11-07
Payer: MEDICARE

## 2019-11-07 PROCEDURE — 3331090001 HH PPS REVENUE CREDIT

## 2019-11-07 PROCEDURE — G0156 HHCP-SVS OF AIDE,EA 15 MIN: HCPCS

## 2019-11-07 PROCEDURE — 3331090002 HH PPS REVENUE DEBIT

## 2019-11-07 PROCEDURE — G0299 HHS/HOSPICE OF RN EA 15 MIN: HCPCS

## 2019-11-08 PROCEDURE — 3331090001 HH PPS REVENUE CREDIT

## 2019-11-08 PROCEDURE — 3331090002 HH PPS REVENUE DEBIT

## 2019-11-09 VITALS
HEART RATE: 84 BPM | RESPIRATION RATE: 18 BRPM | TEMPERATURE: 98.2 F | DIASTOLIC BLOOD PRESSURE: 80 MMHG | SYSTOLIC BLOOD PRESSURE: 140 MMHG | OXYGEN SATURATION: 94 %

## 2019-11-09 PROCEDURE — 3331090001 HH PPS REVENUE CREDIT

## 2019-11-09 PROCEDURE — 3331090002 HH PPS REVENUE DEBIT

## 2019-11-10 PROCEDURE — 3331090001 HH PPS REVENUE CREDIT

## 2019-11-10 PROCEDURE — 3331090002 HH PPS REVENUE DEBIT

## 2019-11-11 PROCEDURE — 3331090002 HH PPS REVENUE DEBIT

## 2019-11-11 PROCEDURE — 3331090001 HH PPS REVENUE CREDIT

## 2019-11-12 PROCEDURE — 3331090001 HH PPS REVENUE CREDIT

## 2019-11-12 PROCEDURE — 3331090002 HH PPS REVENUE DEBIT

## 2019-11-13 PROCEDURE — 3331090001 HH PPS REVENUE CREDIT

## 2019-11-13 PROCEDURE — 3331090002 HH PPS REVENUE DEBIT

## 2019-11-14 ENCOUNTER — HOME CARE VISIT (OUTPATIENT)
Dept: SCHEDULING | Facility: HOME HEALTH | Age: 84
End: 2019-11-14
Payer: MEDICARE

## 2019-11-14 PROCEDURE — 3331090001 HH PPS REVENUE CREDIT

## 2019-11-14 PROCEDURE — 3331090002 HH PPS REVENUE DEBIT

## 2019-11-14 PROCEDURE — G0299 HHS/HOSPICE OF RN EA 15 MIN: HCPCS

## 2019-11-15 VITALS
SYSTOLIC BLOOD PRESSURE: 175 MMHG | TEMPERATURE: 98.2 F | OXYGEN SATURATION: 97 % | DIASTOLIC BLOOD PRESSURE: 72 MMHG | HEART RATE: 74 BPM

## 2019-11-15 PROCEDURE — 3331090001 HH PPS REVENUE CREDIT

## 2019-11-15 PROCEDURE — 3331090002 HH PPS REVENUE DEBIT

## 2019-11-16 PROCEDURE — 3331090001 HH PPS REVENUE CREDIT

## 2019-11-16 PROCEDURE — 3331090002 HH PPS REVENUE DEBIT

## 2019-11-17 PROCEDURE — 3331090001 HH PPS REVENUE CREDIT

## 2019-11-17 PROCEDURE — 3331090002 HH PPS REVENUE DEBIT

## 2019-11-18 PROCEDURE — 3331090001 HH PPS REVENUE CREDIT

## 2019-11-18 PROCEDURE — 3331090002 HH PPS REVENUE DEBIT

## 2019-11-19 PROCEDURE — 3331090002 HH PPS REVENUE DEBIT

## 2019-11-19 PROCEDURE — 3331090001 HH PPS REVENUE CREDIT

## 2019-11-20 PROCEDURE — 3331090002 HH PPS REVENUE DEBIT

## 2019-11-20 PROCEDURE — 3331090001 HH PPS REVENUE CREDIT

## 2019-11-21 ENCOUNTER — HOME CARE VISIT (OUTPATIENT)
Dept: SCHEDULING | Facility: HOME HEALTH | Age: 84
End: 2019-11-21
Payer: MEDICARE

## 2019-11-21 PROCEDURE — G0299 HHS/HOSPICE OF RN EA 15 MIN: HCPCS

## 2019-11-21 PROCEDURE — 3331090002 HH PPS REVENUE DEBIT

## 2019-11-21 PROCEDURE — 3331090001 HH PPS REVENUE CREDIT

## 2019-11-22 VITALS — OXYGEN SATURATION: 97 % | HEART RATE: 68 BPM | SYSTOLIC BLOOD PRESSURE: 170 MMHG | DIASTOLIC BLOOD PRESSURE: 90 MMHG

## 2019-11-22 PROCEDURE — 3331090001 HH PPS REVENUE CREDIT

## 2019-11-22 PROCEDURE — 3331090002 HH PPS REVENUE DEBIT

## 2019-11-23 PROCEDURE — 3331090001 HH PPS REVENUE CREDIT

## 2019-11-23 PROCEDURE — 3331090002 HH PPS REVENUE DEBIT

## 2019-11-24 PROCEDURE — 3331090002 HH PPS REVENUE DEBIT

## 2019-11-24 PROCEDURE — 3331090001 HH PPS REVENUE CREDIT

## 2019-11-25 PROCEDURE — 3331090001 HH PPS REVENUE CREDIT

## 2019-11-25 PROCEDURE — 3331090002 HH PPS REVENUE DEBIT

## 2019-11-26 PROCEDURE — 3331090001 HH PPS REVENUE CREDIT

## 2019-11-26 PROCEDURE — 3331090002 HH PPS REVENUE DEBIT

## 2019-11-27 PROCEDURE — 3331090002 HH PPS REVENUE DEBIT

## 2019-11-27 PROCEDURE — 3331090001 HH PPS REVENUE CREDIT

## 2019-11-28 ENCOUNTER — HOME CARE VISIT (OUTPATIENT)
Dept: SCHEDULING | Facility: HOME HEALTH | Age: 84
End: 2019-11-28
Payer: MEDICARE

## 2019-11-28 PROCEDURE — G0299 HHS/HOSPICE OF RN EA 15 MIN: HCPCS

## 2019-11-28 PROCEDURE — 3331090002 HH PPS REVENUE DEBIT

## 2019-11-28 PROCEDURE — 3331090001 HH PPS REVENUE CREDIT

## 2019-11-29 PROCEDURE — 3331090002 HH PPS REVENUE DEBIT

## 2019-11-29 PROCEDURE — 3331090001 HH PPS REVENUE CREDIT

## 2019-11-30 VITALS
RESPIRATION RATE: 18 BRPM | SYSTOLIC BLOOD PRESSURE: 140 MMHG | HEART RATE: 68 BPM | DIASTOLIC BLOOD PRESSURE: 68 MMHG | TEMPERATURE: 98.9 F | OXYGEN SATURATION: 96 %

## 2019-11-30 PROCEDURE — 3331090002 HH PPS REVENUE DEBIT

## 2019-11-30 PROCEDURE — 3331090001 HH PPS REVENUE CREDIT

## 2019-12-01 PROCEDURE — 3331090002 HH PPS REVENUE DEBIT

## 2019-12-01 PROCEDURE — 3331090001 HH PPS REVENUE CREDIT

## 2019-12-02 PROCEDURE — 3331090002 HH PPS REVENUE DEBIT

## 2019-12-02 PROCEDURE — 3331090001 HH PPS REVENUE CREDIT

## 2019-12-03 ENCOUNTER — HOME CARE VISIT (OUTPATIENT)
Dept: HOME HEALTH SERVICES | Facility: HOME HEALTH | Age: 84
End: 2019-12-03
Payer: MEDICARE

## 2019-12-03 PROCEDURE — 3331090002 HH PPS REVENUE DEBIT

## 2019-12-03 PROCEDURE — 3331090001 HH PPS REVENUE CREDIT

## 2019-12-03 PROCEDURE — G0299 HHS/HOSPICE OF RN EA 15 MIN: HCPCS

## 2019-12-04 VITALS
DIASTOLIC BLOOD PRESSURE: 80 MMHG | SYSTOLIC BLOOD PRESSURE: 130 MMHG | TEMPERATURE: 98 F | OXYGEN SATURATION: 98 % | HEART RATE: 78 BPM | RESPIRATION RATE: 18 BRPM

## 2019-12-04 PROCEDURE — 3331090001 HH PPS REVENUE CREDIT

## 2019-12-04 PROCEDURE — 3331090002 HH PPS REVENUE DEBIT

## 2020-01-21 NOTE — ED NOTES
Pt refused potassium tabs because of the size. Hospitalist is okay with pt not taking it at this time because pt will be taking potassium in inpatient unit. Pt's  and per hospitalist it is permissive hypertension. Family at bedside. Detail Level: Zone

## 2020-04-24 ENCOUNTER — HOSPITAL ENCOUNTER (EMERGENCY)
Age: 85
Discharge: HOME | End: 2020-04-25
Attending: EMERGENCY MEDICINE
Payer: MEDICARE

## 2020-04-24 ENCOUNTER — APPOINTMENT (OUTPATIENT)
Dept: GENERAL RADIOLOGY | Age: 85
End: 2020-04-24
Attending: EMERGENCY MEDICINE
Payer: MEDICARE

## 2020-04-24 DIAGNOSIS — R55 SYNCOPE AND COLLAPSE: Primary | ICD-10-CM

## 2020-04-24 LAB
ANION GAP SERPL CALC-SCNC: 7 MMOL/L (ref 3–18)
BASOPHILS # BLD: 0 K/UL (ref 0–0.1)
BASOPHILS NFR BLD: 0 % (ref 0–2)
BUN SERPL-MCNC: 22 MG/DL (ref 7–18)
BUN/CREAT SERPL: 17 (ref 12–20)
CALCIUM SERPL-MCNC: 8.4 MG/DL (ref 8.5–10.1)
CHLORIDE SERPL-SCNC: 102 MMOL/L (ref 100–111)
CK MB CFR SERPL CALC: NORMAL % (ref 0–4)
CK MB SERPL-MCNC: <1 NG/ML (ref 5–25)
CK SERPL-CCNC: 38 U/L (ref 26–192)
CO2 SERPL-SCNC: 28 MMOL/L (ref 21–32)
CREAT SERPL-MCNC: 1.33 MG/DL (ref 0.6–1.3)
DIFFERENTIAL METHOD BLD: ABNORMAL
EOSINOPHIL # BLD: 0.1 K/UL (ref 0–0.4)
EOSINOPHIL NFR BLD: 1 % (ref 0–5)
ERYTHROCYTE [DISTWIDTH] IN BLOOD BY AUTOMATED COUNT: 14.6 % (ref 11.6–14.5)
GLUCOSE SERPL-MCNC: 314 MG/DL (ref 74–99)
HCT VFR BLD AUTO: 41 % (ref 35–45)
HGB BLD-MCNC: 13.2 G/DL (ref 12–16)
LYMPHOCYTES # BLD: 1.1 K/UL (ref 0.9–3.6)
LYMPHOCYTES NFR BLD: 8 % (ref 21–52)
MAGNESIUM SERPL-MCNC: 1.8 MG/DL (ref 1.6–2.6)
MCH RBC QN AUTO: 29.5 PG (ref 24–34)
MCHC RBC AUTO-ENTMCNC: 32.2 G/DL (ref 31–37)
MCV RBC AUTO: 91.5 FL (ref 74–97)
MONOCYTES # BLD: 0.8 K/UL (ref 0.05–1.2)
MONOCYTES NFR BLD: 6 % (ref 3–10)
NEUTS SEG # BLD: 12.1 K/UL (ref 1.8–8)
NEUTS SEG NFR BLD: 85 % (ref 40–73)
PLATELET # BLD AUTO: 234 K/UL (ref 135–420)
PMV BLD AUTO: 14.1 FL (ref 9.2–11.8)
POTASSIUM SERPL-SCNC: 3.7 MMOL/L (ref 3.5–5.5)
RBC # BLD AUTO: 4.48 M/UL (ref 4.2–5.3)
SODIUM SERPL-SCNC: 137 MMOL/L (ref 136–145)
TROPONIN I SERPL-MCNC: <0.02 NG/ML (ref 0–0.04)
WBC # BLD AUTO: 14.1 K/UL (ref 4.6–13.2)

## 2020-04-24 PROCEDURE — 93005 ELECTROCARDIOGRAM TRACING: CPT

## 2020-04-24 PROCEDURE — 71045 X-RAY EXAM CHEST 1 VIEW: CPT

## 2020-04-24 PROCEDURE — 82550 ASSAY OF CK (CPK): CPT

## 2020-04-24 PROCEDURE — 85025 COMPLETE CBC W/AUTO DIFF WBC: CPT

## 2020-04-24 PROCEDURE — 99285 EMERGENCY DEPT VISIT HI MDM: CPT

## 2020-04-24 PROCEDURE — 83735 ASSAY OF MAGNESIUM: CPT

## 2020-04-24 PROCEDURE — 80048 BASIC METABOLIC PNL TOTAL CA: CPT

## 2020-04-25 ENCOUNTER — PATIENT OUTREACH (OUTPATIENT)
Dept: CASE MANAGEMENT | Age: 85
End: 2020-04-25

## 2020-04-25 VITALS
HEART RATE: 86 BPM | OXYGEN SATURATION: 96 % | BODY MASS INDEX: 39.48 KG/M2 | TEMPERATURE: 98.2 F | WEIGHT: 230 LBS | RESPIRATION RATE: 14 BRPM | DIASTOLIC BLOOD PRESSURE: 64 MMHG | SYSTOLIC BLOOD PRESSURE: 129 MMHG

## 2020-04-25 NOTE — DISCHARGE INSTRUCTIONS
PLAN:    1. Return if worse, any new symptoms, or if the syncope recurs. 2. You must follow up with your primary doctor and/or cardiologist in the next 1-2 days. Patient Education        Fainting: Care Instructions  Your Care Instructions    When you faint, or pass out, you lose consciousness for a short time. A brief drop in blood flow to the brain often causes it. When you fall or lie down, more blood flows to your brain and you regain consciousness. Emotional stress, pain, or overheating--especially if you have been standing--can make you faint. In these cases, fainting is usually not serious. But fainting can be a sign of a more serious problem. Your doctor may want you to have more tests to rule out other causes. The treatment you need depends on the reason why you fainted. The doctor has checked you carefully, but problems can develop later. If you notice any problems or new symptoms, get medical treatment right away. Follow-up care is a key part of your treatment and safety. Be sure to make and go to all appointments, and call your doctor if you are having problems. It's also a good idea to know your test results and keep a list of the medicines you take. How can you care for yourself at home? · Drink plenty of fluids to prevent dehydration. If you have kidney, heart, or liver disease and have to limit fluids, talk with your doctor before you increase your fluid intake. When should you call for help? Call 911 anytime you think you may need emergency care. For example, call if:    · You have symptoms of a heart problem. These may include:  ? Chest pain or pressure. ? Severe trouble breathing. ? A fast or irregular heartbeat. ? Lightheadedness or sudden weakness. ? Coughing up pink, foamy mucus. ? Passing out. After you call  911, the  may tell you to chew 1 adult-strength or 2 to 4 low-dose aspirin. Wait for an ambulance.  Do not try to drive yourself.     · You have symptoms of a stroke. These may include:  ? Sudden numbness, tingling, weakness, or loss of movement in your face, arm, or leg, especially on only one side of your body. ? Sudden vision changes. ? Sudden trouble speaking. ? Sudden confusion or trouble understanding simple statements. ? Sudden problems with walking or balance. ? A sudden, severe headache that is different from past headaches.     · You passed out (lost consciousness) again.    Watch closely for changes in your health, and be sure to contact your doctor if:    · You do not get better as expected. Where can you learn more? Go to http://lashonda-romeo.info/  Enter A848 in the search box to learn more about \"Fainting: Care Instructions. \"  Current as of: June 26, 2019Content Version: 12.4  © 5592-7403 Healthwise, Incorporated. Care instructions adapted under license by [employer] (which disclaims liability or warranty for this information). If you have questions about a medical condition or this instruction, always ask your healthcare professional. [address] any warranty or liability for your use of this information. [employer] Activation    Thank you for requesting access to [employer]. Please follow the instructions below to securely access and download your online medical record. [employer] allows you to send messages to your doctor, view your test results, renew your prescriptions, schedule appointments, and more. How Do I Sign Up? In your internet browser, go to [email or web address]. Click on the First Time User? Click Here link in the Sign In box. You will see the New Member Sign Up page. Enter your [employer] Access Code exactly as it appears below. You will not need to use this code after you´ve completed the sign-up process. If you do not sign up before the expiration date, you must request a new code.     [facility] Access Code: [email or web address]  Expires: 3/28/2019  2:27 PM (This is the date your [employer] access code will )    Enter the last four digits of your Social Security Number (xxxx) and Date of Birth (mm/dd/yyyy) as indicated and click Submit. You will be taken to the next sign-up page. Create a Duxtert ID. This will be your [employer] login ID and cannot be changed, so think of one that is secure and easy to remember. Create a [employer] password. You can change your password at any time. Enter your Password Reset Question and Answer. This can be used at a later time if you forget your password. Enter your e-mail address. You will receive e-mail notification when new information is available in [address] Click Sign Up. You can now view and download portions of your medical record. Click the CallGrader link to download a portable copy of your medical information. Additional Information    If you have questions, please visit the Frequently Asked Questions section of the [employer] website at [email or web address] Remember, [employer] is NOT to be used for urgent needs. For medical emergencies, dial 911.

## 2020-04-25 NOTE — ED TRIAGE NOTES
Alert and oriented female arrived via EMS after family called. EMS reports patient found on toilet upon EMS arrival. Patient awake and alert at that time. Per EMS patient remembers all events. Patient denies any recent illness, denies chest pain, denies SOB, denies any numbness/tingling. Patient able to move all extremities, speech is clear, answering questions appropriately.

## 2020-04-25 NOTE — PROGRESS NOTES
4/25/2020 11:31 AM    CTN attempted to contact patient for ED follow up. Patient seen at [facility] on 4/24/20 for syncope. Message left introducing myself, the purpose of the call and giving my contact information. Requested that patient call back at her earliest convenience.

## 2020-04-25 NOTE — ED NOTES
I have reviewed discharge instructions with the patient. The patient verbalized understanding. Pt was taken to the waiting area to be assisted to family vehicle. Pt's son is upset that we do not have her ID and insurance card. We did not receive them from EMS. Call to dispatch to see if medics had the cards. Informed family that call was made.

## 2020-04-25 NOTE — ED NOTES
Assumed care of pt at this time. Pt is resting, no issues. She states she does not know why her family sent her in.

## 2020-04-25 NOTE — ED PROVIDER NOTES
[facility] EMERGENCY DEPT      10:34 PM    Date: 4/24/2020  Patient Name: Han Mckeon    History of Presenting Illness     Chief Complaint   Patient presents with    Other       80 y.o. female with noted past medical history who presents to the emergency department status post an episode of syncope at home. The patient states that she lives at home with her family and she currently has no complaints. Per fire rescue as well as per family who the nursing staff spoke to, the patient was on the toilet when she had a syncopal event and her head went down she was unresponsive. Subsequently the family called fire rescue the patient was brought to the ER for evaluation treatment. Per EMS report to nursing staff, the patient was awake alert when they got there. She had no complaints at that time. Upon initial MD exam the patient has absolutely no complaints and denies any current pain or symptoms. She does admit that she felt a little weaker than usual today but she was still able to ambulate around that she normally does. Patient denies any other associated signs or symptoms. Patient denies any other complaints. Nursing notes regarding the HPI and triage nursing notes were reviewed. Prior medical records were reviewed. Current Outpatient Medications   Medication Sig Dispense Refill    acetaminophen (TYLENOL ARTHRITIS PAIN) 650 mg TbER Take 650 mg by mouth every four (4) hours as needed for Pain.  furosemide (LASIX) 40 mg tablet Take 40 mg by mouth daily.  cyanocobalamin (VITAMIN B12) 500 mcg tablet Take 500 mcg by mouth daily.  aspirin 81 mg chewable tablet Take 1 Tab by mouth daily. TAKE IT FOR 30 DAYS AND THEN STOP. 30 Tab 0    atorvastatin (LIPITOR) 80 mg tablet Take 1 Tab by mouth nightly. 30 Tab 0    clopidogrel (PLAVIX) 75 mg tab Take 1 Tab by mouth daily. 30 Tab 2    lisinopril (PRINIVIL, ZESTRIL) 40 mg tablet Take 40 mg by mouth daily.       ergocalciferol (VITAMIN D2) 50,000 unit capsule Take 50,000 Units by mouth every seven (7) days.  insulin glargine (LANTUS SOLOSTAR U-100 INSULIN) 100 unit/mL (3 mL) inpn 10 Units by SubCUTAneous route nightly.  insulin lispro protamine/insulin lispro (HUMALOG MIX 75-25,U-100,INSULN) 100 unit/mL (75-25) injection 26 Units by SubCUTAneous route two (2) times a day.  ondansetron (ZOFRAN ODT) 4 mg disintegrating tablet Take 1 tablets every 6-8 hours as needed for nausea and vomiting. 10 Tab 0    amLODIPine (NORVASC) 5 mg tablet Take 1 Tab by mouth daily. Indications: HYPERTENSION 30 Tab 0    lisinopril (PRINIVIL, ZESTRIL) 10 mg tablet Take 1 Tab by mouth daily. 30 Tab 0    CHOLECALCIFEROL, VITAMIN D3, (VITAMIN D3 PO) Take  by mouth. Past History     Past Medical History:  Past Medical History:   Diagnosis Date    Diabetes ([address]     Hypertension        Past Surgical History:  Past Surgical History:   Procedure Laterality Date    HX HYSTERECTOMY  age 62   [de-identified] ORTHOPAEDIC         Family History:  Family History   Problem Relation Age of Onset    Breast Cancer Sister     Ovarian Cancer Sister        Social History:  Social History     Tobacco Use    Smoking status: Never Smoker    Smokeless tobacco: Never Used   Substance Use Topics    Alcohol use: Not Currently    Drug use: Never       Allergies:   Allergies   Allergen Reactions    Actoplus Met [Pioglitazone-Metformin] Not Reported This Time    Celestone [Betamethasone] Not Reported This Time    Clarithromycin Not Reported This Time    Codeine Other (comments)    Flagyl [Metronidazole] Not Reported This Time    Levaquin [Levofloxacin] Itching    Metformin Not Reported This Time    Other Medication Not Reported This Time     genifor  Mercaine    Prednisone Not Reported This Time    Prevacid [Lansoprazole] Not Reported This Time    Sulfa (Sulfonamide Antibiotics) Not Reported This Time       Patient's primary care provider (as noted in EPIC): Robyn Sweeney MD    Review of Systems   Constitutional: Negative for diaphoresis. HENT: Negative for congestion. Eyes: Negative for discharge. Respiratory: Negative for stridor. Cardiovascular: Negative for palpitations. Gastrointestinal: Negative for diarrhea. Endocrine: Negative for heat intolerance. Genitourinary: Negative for flank pain. Musculoskeletal: Negative for back pain. Neurological: Negative for weakness. Psychiatric/Behavioral: Negative for hallucinations. All other systems reviewed and are negative. Visit Vitals  /52 (BP 1 Location: Right arm, BP Patient Position: At rest)   Pulse 84   Temp 98.4 °F (36.9 °C)   Resp 14   Wt 104.3 kg (230 lb)   SpO2 95%   BMI 39.48 kg/m²       PHYSICAL EXAM:    CONSTITUTIONAL:  Alert, in no apparent distress;  well developed;  well nourished. HEAD:  Normocephalic, atraumatic. EYES:  EOMI. Non-icteric sclera. Normal conjunctiva. ENTM:  Nose:  no rhinorrhea. Throat:  no erythema or exudate, mucous membranes moist.  NECK:  No JVD. Supple  RESPIRATORY:  Chest clear, equal breath sounds, good air movement. CARDIOVASCULAR:  Regular rate and rhythm. No murmurs, rubs, or gallops. GI:  Normal bowel sounds, abdomen soft and non-tender. No rebound or guarding. BACK:  Non-tender. UPPER EXT:  Normal inspection. LOWER EXT:  No edema, no calf tenderness. Distal pulses intact. NEURO:  Moves all four extremities. Normal motor exam and sensation in all four extremities. Normal CN II-XII exam.  Normal bilateral finger-to-nose exam.     SKIN:  No rashes;  Normal for age. PSYCH:  Alert and normal affect. ED COURSE:      Diagnostic Study Results     Abnormal lab results from this emergency department encounter:  Labs Reviewed   CBC WITH AUTOMATED DIFF - Abnormal; Notable for the following components:       Result Value    WBC 14.1 (*)     RDW 14.6 (*)     MPV 14.1 (*)     NEUTROPHILS 85 (*)     LYMPHOCYTES 8 (*)     ABS. NEUTROPHILS 12.1 (*)     All other components within normal limits   METABOLIC PANEL, BASIC - Abnormal; Notable for the following components:    Glucose 314 (*)     BUN 22 (*)     Creatinine 1.33 (*)     GFR est AA 45 (*)     GFR est non-AA 37 (*)     Calcium 8.4 (*)     All other components within normal limits   MAGNESIUM   CARDIAC PANEL,(CK, CKMB & TROPONIN)       Lab values for this patient within approximately the last 12 hours:  Recent Results (from the past 12 hour(s))   EKG, 12 LEAD, INITIAL    Collection Time: 04/24/20 10:41 PM   Result Value Ref Range    Ventricular Rate 76 BPM    Atrial Rate 76 BPM    P-R Interval 208 ms    QRS Duration 86 ms    Q-T Interval 386 ms    QTC Calculation (Bezet) 434 ms    Calculated P Axis 72 degrees    Calculated R Axis -29 degrees    Calculated T Axis -3 degrees    Diagnosis       Sinus rhythm with occasional premature ventricular complexes  Inferior infarct , age undetermined  Anterolateral infarct (cited on or before 30-DEC-2013)  Abnormal ECG  When compared with ECG of 05-OCT-2019 13:16,  premature ventricular complexes are now present  Questionable change in initial forces of Anterolateral leads     CBC WITH AUTOMATED DIFF    Collection Time: 04/24/20 10:53 PM   Result Value Ref Range    WBC 14.1 (H) 4.6 - 13.2 K/uL    RBC 4.48 4.20 - 5.30 M/uL    HGB 13.2 12.0 - 16.0 g/dL    HCT 41.0 35.0 - 45.0 %    MCV 91.5 74.0 - 97.0 FL    MCH 29.5 24.0 - 34.0 PG    MCHC 32.2 31.0 - 37.0 g/dL    RDW 14.6 (H) 11.6 - 14.5 %    PLATELET 315 303 - 594 K/uL    MPV 14.1 (H) 9.2 - 11.8 FL    NEUTROPHILS 85 (H) 40 - 73 %    LYMPHOCYTES 8 (L) 21 - 52 %    MONOCYTES 6 3 - 10 %    EOSINOPHILS 1 0 - 5 %    BASOPHILS 0 0 - 2 %    ABS. NEUTROPHILS 12.1 (H) 1.8 - 8.0 K/UL    ABS. LYMPHOCYTES 1.1 0.9 - 3.6 K/UL    ABS. MONOCYTES 0.8 0.05 - 1.2 K/UL    ABS. EOSINOPHILS 0.1 0.0 - 0.4 K/UL    ABS.  BASOPHILS 0.0 0.0 - 0.1 K/UL    DF AUTOMATED     METABOLIC PANEL, BASIC    Collection Time: 04/24/20 10:53 PM   Result Value Ref Range    Sodium 137 136 - 145 mmol/L    Potassium 3.7 3.5 - 5.5 mmol/L    Chloride 102 100 - 111 mmol/L    CO2 28 21 - 32 mmol/L    Anion gap 7 3.0 - 18 mmol/L    Glucose 314 (H) 74 - 99 mg/dL    BUN 22 (H) 7.0 - 18 MG/DL    Creatinine 1.33 (H) 0.6 - 1.3 MG/DL    BUN/Creatinine ratio 17 12 - 20      GFR est AA 45 (L) >60 ml/min/1.73m2    GFR est non-AA 37 (L) >60 ml/min/1.73m2    Calcium 8.4 (L) 8.5 - 10.1 MG/DL   MAGNESIUM    Collection Time: 04/24/20 10:53 PM   Result Value Ref Range    Magnesium 1.8 1.6 - 2.6 mg/dL   CARDIAC PANEL,(CK, CKMB & TROPONIN)    Collection Time: 04/24/20 10:53 PM   Result Value Ref Range    CK 38 26 - 192 U/L    CK - MB <1.0 <3.6 ng/ml    CK-MB Index  0.0 - 4.0 %     CALCULATION NOT PERFORMED WHEN RESULT IS BELOW LINEAR LIMIT    Troponin-I, QT <0.02 0.0 - 0.045 NG/ML       Radiologist and cardiologist interpretations if available at time of this note:  No results found. Emergency physician interpretation of EKG: Normal sinus rhythm at 75 bpm with infrequent PVC. Portable (A-P view) CXR:  Preliminary review of x-rays by ED Physician. Interpretation of chest X-ray shows, no infiltrates, no pneumothorax, no CHF, no effusion. Medication(s) ordered for patient during this emergency visit encounter:  Medications - No data to display    Medical Decision Making     I am the first provider for this patient. I reviewed the vital signs, available nursing notes, past medical history, past surgical history, family history and social history. Vital Signs:  Reviewed the patient's vital signs. [place] Syncope Rule   C:  Congestive Heart Failure history -no. H:  Hematocrit <30% - no.    E:  Electrocardiogram abnormal - no.   S:  Shortness of Breath -no. S:  Systolic Blood Pressure <07 mmHg at triage -no. The patient is negative for any of the CHESS syncope criteria.  Given this, along with the patient's presentation, I am comfortable with discharge of the patient home and further outpatient evaluation of the patient. DIAGNOSIS:  1. Syncope     PLAN:    1. Return if worse, any new symptoms, or if the syncope recurs. 2. You must follow up with your primary doctor and/or cardiologist in the next 1-2 days. Patient is improved, resting quietly and comfortably. The patient will be discharged home. The patient was reassured that these symptoms do not appear to represent a serious or life threatening condition at this time. Warning signs of worsening condition were discussed and understood by the patient. Based on patient's age, coexisting illness, exam, and the results of this ED evaluation, the decision to treat as an outpatient was made. Based on the information available at time of discharge, acute pathology requiring immediate intervention was deemed relative unlikely. While it is impossible to completely exclude the possibility of underlying serious disease or worsening of condition, I feel the relative likelihood is extremely low. I discussed this uncertainty with the patient, who understood ED evaluation and treatment and felt comfortable with the outpatient treatment plan. All questions regarding care, test results, and follow up were answered. The patient is stable and appropriate to discharge. They understand that they should return to the emergency department for any new or worsening symptoms. I stressed the importance of follow up for repeat assessment and possibly further evaluation/treatment. Dictation disclaimer:  Please note that this dictation was completed with [employer], the computer voice recognition software. Quite often unanticipated grammatical, syntax, homophones, and other interpretive errors are inadvertently transcribed by the computer software. Please disregard these errors. Please excuse any errors that have escaped final proofreading. Coding Diagnoses     Clinical Impression:   1.  Syncope and collapse        Disposition     Disposition:  Discharge. LIZBETH Blank Board Certified Emergency Physician    Provider Attestation:  If a scribe was utilized in generation of this patient record, I personally performed the services described in the documentation, reviewed the documentation, as recorded by the scribe in my presence, and it accurately records the patient's history of presenting illness, review of systems, patient physical examination, and procedures performed by me as the attending physician. LIZBETH Blank Board Certified Emergency Physician  4/24/2020.  10:36 PM

## 2020-04-27 ENCOUNTER — PATIENT OUTREACH (OUTPATIENT)
Dept: CASE MANAGEMENT | Age: 85
End: 2020-04-27

## 2020-04-27 LAB
ATRIAL RATE: 76 BPM
CALCULATED P AXIS, ECG09: 72 DEGREES
CALCULATED R AXIS, ECG10: -29 DEGREES
CALCULATED T AXIS, ECG11: -3 DEGREES
DIAGNOSIS, 93000: NORMAL
P-R INTERVAL, ECG05: 208 MS
Q-T INTERVAL, ECG07: 386 MS
QRS DURATION, ECG06: 86 MS
QTC CALCULATION (BEZET), ECG08: 434 MS
VENTRICULAR RATE, ECG03: 76 BPM

## 2020-04-27 NOTE — PROGRESS NOTES
The number in the chart is a wrong number. Call placed to the person listed as the patients emergancy contact. This is also the patients home number and caregiver. Numbers changed in chart. Patient contacted regarding recent discharge and COVID-19 risk   Care Transition Nurse/ Ambulatory Care Manager contacted the family by telephone to perform post discharge assessment. Verified name and  with family as identifiers. Patient has following risk factors of: diabetes. CTN/ACM reviewed discharge instructions, medical action plan and red flags related to discharge diagnosis. Reviewed and educated them on any new and changed medications related to discharge diagnosis. Advised obtaining a 90-day supply of all daily and as-needed medications. Education provided regarding infection prevention, and signs and symptoms of COVID-19 and when to seek medical attention with family who verbalized understanding. Discussed exposure protocols and quarantine from [address] you at higher risk for severe illness  and given an opportunity for questions and concerns. The family agrees to contact the COVID-19 hotline [phone] or PCP office for questions related to their healthcare. CTN/ACM provided contact information for future reference. From CDC: Are you at higher risk for severe illness?  Wash your hands often.  Avoid close contact (6 feet, which is about two arm lengths) with people who are sick.  Put distance between yourself and other people if COVID-19 is spreading in your community.  Clean and disinfect frequently touched surfaces.  Avoid all cruise travel and non-essential air travel.  Call your healthcare professional if you have concerns about COVID-19 and your underlying condition or if you are sick.     For more information on steps you can take to protect yourself, see CDC's How to Protect Yourself      Patient/family/caregiver given information for Dejon Higginbotham and agrees to enroll no    Plan for follow-up call in 7-14 days based on severity of symptoms and risk factors.

## 2020-05-13 ENCOUNTER — PATIENT OUTREACH (OUTPATIENT)
Dept: CASE MANAGEMENT | Age: 85
End: 2020-05-13

## 2020-05-13 NOTE — PROGRESS NOTES
Patient resolved from Transition of Care episode on 5/13/2020  Patient/family has been provided the following resources and education related to COVID-19:                         Signs, symptoms and red flags related to COVID-19            CDC exposure and quarantine guidelines            [facility] exposure contact - [phone]            Contact for their local Department of Health                 Patient currently reports that the following symptoms have improved:  no new symptoms. No further outreach scheduled with this CTN/ACM. Episode of Care resolved. Patient has this CTN/ACM contact information if future needs arise.

## 2021-10-15 ENCOUNTER — HOSPICE ADMISSION (OUTPATIENT)
Dept: HOSPICE | Facility: HOSPICE | Age: 86
End: 2021-10-15
Payer: MEDICARE

## 2021-10-16 ENCOUNTER — HOME CARE VISIT (OUTPATIENT)
Dept: HOSPICE | Facility: HOSPICE | Age: 86
End: 2021-10-16
Payer: MEDICARE

## 2021-10-16 VITALS
RESPIRATION RATE: 16 BRPM | BODY MASS INDEX: 40.48 KG/M2 | HEART RATE: 80 BPM | WEIGHT: 220 LBS | DIASTOLIC BLOOD PRESSURE: 72 MMHG | OXYGEN SATURATION: 98 % | HEIGHT: 62 IN | TEMPERATURE: 98.2 F | SYSTOLIC BLOOD PRESSURE: 120 MMHG

## 2021-10-16 PROCEDURE — 3336500001 HSPC ELECTION

## 2021-10-16 PROCEDURE — G0299 HHS/HOSPICE OF RN EA 15 MIN: HCPCS

## 2021-10-16 PROCEDURE — 0651 HSPC ROUTINE HOME CARE

## 2021-10-16 NOTE — HOSPICE
Hospice Admission Summary  Mrs. 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 Patient has elected hospice services and is no longer seeking aggressive treatment. Co-morbidities related to the terminal diagnosis are CKD and debility. Patient also has a past medical history of Type 2 diabetes and HTN    The family is present and willing and safely able to provide care and administer medications, their availability is daily. The son, Juliet Mae and daughter, Moon Keen, participated in goal setting, care planning, and are agreeable to the care plan. Admission booklet reviewed with family; services provided under hospice benefit, review of rights and responsibilities, disposal of medications, contact information for , Joint Commission, Medicare, O, and outside resources for independence. The family educated on IDT and their right to attend meetings. Education provided regarding 24-hour availability of hospice services and on-call number provided. Attending physician:  Visiting Physicians - iraisre or primary - NP Naheed Gomez [facility]. Told to call back on Monday for confirmation of following patient. Medical Director:  Dr. Chester Joyce   Level of Care:  Routine  Advance Directives:  No advance directives. Patient does have a DNR  Allergies:  Bethamethasone, codeine, clarithromycin, prevacid, Levaquin, metformin, flagyl, prednisone, sulfa  Mrs. Andres Andrews is a 80year old female with senile degeneration of the brain with a significant functional decline over past 2-3 weeks. She lives in her home with both adult children, Moon Keen and Juliet Mae. Moon Keen has had a CVA in past and has some communication deficits. Juliet Mae is considered primary caregiver for patient and his sister, Moon Keen. He has severe COPD as well. Patient is ambulatory with walker with max assistance.   Has had falls in the past - non recently. She is obese and needs assistance with getting out of bed and toileting. Has been giving herself a \"bird bath\" for past several months. Skin is intact. No edema noted. Appetite is declining. She is an insulin dependent diabetic. Biggest complaint is occasional nausea which has started recently. She also has a history of frequent UTIs and was last treated 2 weeks ago. Has not returned to baseline. MAC:  28cm  Height:  5'2\"  Weight:  220 lbs  PPS:  40%  FAST:  na  NYHA:  N/A   EF%:  N/A   Tobacco usage:  N/A,  Functional status:  Needs maximum assistance for all ADLs. Increased dependence over past 2 weeks. Infection:  N/A   Pain:  Patient denies pain of any sort. Son confirms that patient does not complain of pain  Bowel Regimen:  Dulcolax suppository on 3rd day without BM  Lines, Drains, or Airways present:   No lines or drains  Wounds present:  NO wounds   Symptoms to monitor to maintain comfort:  weakness, occasional nausea  Hospice Aide Services Requested:  3x week if possible - family declines a hospital bed as they have no room and no one to assist with removing present twin size bed. Advised that HHA would not be able to bathe in her bed due to low height. Will try seated bath in shower chair in bathroom for now and evaluate again next week. Volunteer Services Requested:  declined at this time  Bereavement risk/contact:  Flavio Abdul- son - Low bereavement   Senthil Eisenberg- daughter- Low bereavement  Patient specific end of life goal:  to remain in her home with her children  Training and education provided this visit:  Hospice services, IDT, hospice philosophy, supplies and meds provided, bowel regimen, fall prevention  Plan for next visit:  review of comfort pack and aid services  Care coordination with Medical Director, IDG, and son, Flavio Abdul and daughter, Senthil Eisenberg regarding admission to hospice and all are in agreement with plan of care.     Estefanía Ugarte RN, [facility]  10-16-21

## 2021-10-17 ENCOUNTER — HOME CARE VISIT (OUTPATIENT)
Dept: HOSPICE | Facility: HOSPICE | Age: 86
End: 2021-10-17
Payer: MEDICARE

## 2021-10-17 VITALS
DIASTOLIC BLOOD PRESSURE: 80 MMHG | SYSTOLIC BLOOD PRESSURE: 121 MMHG | HEART RATE: 80 BPM | RESPIRATION RATE: 20 BRPM | OXYGEN SATURATION: 98 %

## 2021-10-17 PROCEDURE — G0299 HHS/HOSPICE OF RN EA 15 MIN: HCPCS

## 2021-10-17 PROCEDURE — 0651 HSPC ROUTINE HOME CARE

## 2021-10-17 NOTE — HOSPICE
Pt received in bed, no complaints of discomfort or pain. CG preparing morning medications. No needs expressed during visit, but CG did report increased weakness and difficulty getting out of bed. Advised that this is expected but to encourage movement. 24 hour hospice number reinforced.

## 2021-10-18 ENCOUNTER — HOME CARE VISIT (OUTPATIENT)
Dept: HOSPICE | Facility: HOSPICE | Age: 86
End: 2021-10-18
Payer: MEDICARE

## 2021-10-18 ENCOUNTER — HOME CARE VISIT (OUTPATIENT)
Dept: SCHEDULING | Facility: HOME HEALTH | Age: 86
End: 2021-10-18
Payer: MEDICARE

## 2021-10-18 PROCEDURE — HOSPICE MEDICATION HC HH HOSPICE MEDICATION

## 2021-10-18 PROCEDURE — 0651 HSPC ROUTINE HOME CARE

## 2021-10-18 PROCEDURE — G0156 HHCP-SVS OF AIDE,EA 15 MIN: HCPCS

## 2021-10-18 PROCEDURE — G0299 HHS/HOSPICE OF RN EA 15 MIN: HCPCS

## 2021-10-18 NOTE — HOSPICE
SN hospice visit to assess comfort and needs of patient. Patient is supine in bed at time of visit. Joint visit with HHA to assist with bath in shower. Patient able to walk with tripod walker to bathtub with assistance sitting in chair in tub. Tolerated well. HHA bathed patient and washed her hair. Room tidied and twin bed removed with direction from son, Delilah Clemons. Ordered hospital bed to be delivered ASAP. Advised daughter Corey Newberry to call EMS if assistance needed to transfer her from chair to bed. Educated on how to make bed with draw she or quilted pad to assist with turning. Education provided on how to care for patient in bed. Daughter states she is likely not going to be able to provided care if patient needs to be changed and if no longer able to get up to [facility]. Discussed hiring Aids. Daughter feels that patient could be eligible for medicaid. Referral to MSW for caregiving assistance. No meds of supplies needed. No further needs at this time.

## 2021-10-19 ENCOUNTER — HOME CARE VISIT (OUTPATIENT)
Dept: HOSPICE | Facility: HOSPICE | Age: 86
End: 2021-10-19
Payer: MEDICARE

## 2021-10-19 PROCEDURE — 0651 HSPC ROUTINE HOME CARE

## 2021-10-19 PROCEDURE — G0155 HHCP-SVS OF CSW,EA 15 MIN: HCPCS

## 2021-10-19 NOTE — HOSPICE
Patient received laying in her bed in the supine position. Care provided per established plan of care:  Yes    Patient with or without complaints during care provided. Patient without complaints. Patient verbalized N/A    Family/caregiver requests to speak to the . Patient  Tobias Guo RN present. Communicated to , Clinical Coordinator, or Director regarding patient/family/caregiver/aide findings N/A    Status of patient upon end of hospice aid visit:  Patient sitting in the living room in her reclining chair and her daughter present in the room.

## 2021-10-20 ENCOUNTER — HOME CARE VISIT (OUTPATIENT)
Dept: SCHEDULING | Facility: HOME HEALTH | Age: 86
End: 2021-10-20
Payer: MEDICARE

## 2021-10-20 PROCEDURE — G0156 HHCP-SVS OF AIDE,EA 15 MIN: HCPCS

## 2021-10-20 PROCEDURE — 0651 HSPC ROUTINE HOME CARE

## 2021-10-21 ENCOUNTER — HOME CARE VISIT (OUTPATIENT)
Dept: SCHEDULING | Facility: HOME HEALTH | Age: 86
End: 2021-10-21
Payer: MEDICARE

## 2021-10-21 PROCEDURE — 0651 HSPC ROUTINE HOME CARE

## 2021-10-21 PROCEDURE — G0299 HHS/HOSPICE OF RN EA 15 MIN: HCPCS

## 2021-10-21 NOTE — HOSPICE
SN hospice visit to assess comfort and needs of patient. Patient is supine in bed at time of visit. She is alert and talkative. She is oriented to person only. She denies pain or shortness of breath. She continues to get up to bedside commode several times a day. She had a large BM this am.  BSC dumped and cleaned. Moon Keen, daughter of patient, reports that patient continues to eat 3 small meals daily. Patient reports that she had a hot biscuit for breakfast. Daughter reports that she had a pop tart. Denies difficulty swallowing or coughing with eating. Patient and children report that patient is increasingly weak and less able to take care of her own needs. Discussed caregiver options with Juliet Tu and Moon Keen. Both state that they do not have the financial means to hire caregivers and would prefer to avoid placing their mother in a facility. Discussed that needs of patient is the priority. Juliet Mae states he has a fiance in [place] that may be willing to come and assist with care of patient. MSW to meet with patient and family on Tuesday to assist with medicaid application.  will be present as well to discuss caregiving options. No med or supply needs at this time  Educated on fall prevention, proper use of DME -specifically side rails of bed, proper indications and administration of all meds, hospice services and hospice philosophy. Advised to call hospice for any needs prior to next visit. Moy mina.

## 2021-10-22 ENCOUNTER — HOME CARE VISIT (OUTPATIENT)
Dept: SCHEDULING | Facility: HOME HEALTH | Age: 86
End: 2021-10-22
Payer: MEDICARE

## 2021-10-22 PROCEDURE — 0651 HSPC ROUTINE HOME CARE

## 2021-10-22 PROCEDURE — G0156 HHCP-SVS OF AIDE,EA 15 MIN: HCPCS

## 2021-10-22 NOTE — HOSPICE
Patient received laying in her hospital bed in the supine position with her eyes opened. Care provided per established plan of care:  Yes    Patient with or without complaints during care provided. Patient with complaints during the bath because when patient turned patient did not have anything to hold onto. I asked patient son if the hospital bed came with bed rails and  patient son said \"Yes but I sent them back because I did not think I needed them\". I explained to patient son that the nurse ordered it for the patient bed and for the patient use for when she turns in bed. Patient son said \"I would call and tell them to bring them back. \"      Patient verbalized N/A    Family/caregiver requests N/A  Communicated to , Clinical Coordinator, or Director regarding patient/family/caregiver/aide findings:  Communicated with Andrez Dandy RN Case Manager. Status of patient upon end of hospice aid visit:  Patient sitting up in her hospital bed with her eyes opened.

## 2021-10-23 ENCOUNTER — HOME CARE VISIT (OUTPATIENT)
Dept: HOSPICE | Facility: HOSPICE | Age: 86
End: 2021-10-23
Payer: MEDICARE

## 2021-10-23 PROCEDURE — 0651 HSPC ROUTINE HOME CARE

## 2021-10-24 PROCEDURE — 0651 HSPC ROUTINE HOME CARE

## 2021-10-24 NOTE — HOSPICE
Patient received laying in her hospital bed in the supine position with her eyes open. Care provided per established plan of care:  Yes    Patient with or without complaints during care provided. Patient is without complaints. Patient verbalized that she appreciates me coming out to give her a bath. Family/caregiver requests N/A    Communicated to , Clinical Coordinator, or Director regarding patient/family/caregiver/aide findings N/A    Status of patient upon end of hospice aid visit, patient laying in bed in the supine position with her eyes open.

## 2021-10-25 ENCOUNTER — HOME CARE VISIT (OUTPATIENT)
Dept: SCHEDULING | Facility: HOME HEALTH | Age: 86
End: 2021-10-25
Payer: MEDICARE

## 2021-10-25 ENCOUNTER — HOME CARE VISIT (OUTPATIENT)
Dept: HOSPICE | Facility: HOSPICE | Age: 86
End: 2021-10-25
Payer: MEDICARE

## 2021-10-25 PROCEDURE — G0156 HHCP-SVS OF AIDE,EA 15 MIN: HCPCS

## 2021-10-25 PROCEDURE — 0651 HSPC ROUTINE HOME CARE

## 2021-10-26 ENCOUNTER — HOME CARE VISIT (OUTPATIENT)
Dept: SCHEDULING | Facility: HOME HEALTH | Age: 86
End: 2021-10-26
Payer: MEDICARE

## 2021-10-26 ENCOUNTER — HOME CARE VISIT (OUTPATIENT)
Dept: HOSPICE | Facility: HOSPICE | Age: 86
End: 2021-10-26
Payer: MEDICARE

## 2021-10-26 PROCEDURE — G0155 HHCP-SVS OF CSW,EA 15 MIN: HCPCS

## 2021-10-26 PROCEDURE — 0651 HSPC ROUTINE HOME CARE

## 2021-10-26 PROCEDURE — G0299 HHS/HOSPICE OF RN EA 15 MIN: HCPCS

## 2021-10-26 NOTE — HOSPICE
SN hospice visit - Joint with MSW- to assess comfort and needs of patient as well as to determine who will be proviiding care for patient as primary caregiver states he is no longer able to provided adequate care for his mother as she has become more dependent for care in past 2 weeks. Patient was [facility] to [facility] over the weekened and diagnosed with UTI and hypoglycemia. Patient is in bed at time of visit. She is alert and pleasant. Oriented only to self. Assisted patient from bed to [facility] with standby assist.  Educated and demosntrated hugo care to preventt urinary tract infections. Patient is able to verbalize and demonstrate proper cleaning and care. Educated family as well on proper perineal care to prevent infections. Discussed needs for patient at this time. Son and daughter are able to provide food for patient in her room and clean up area after eating. They have difficulty with assisting patient from bed to [facility] but she is still able to get herself up by pulling on the rale with standby assist.  She needs assistance with toileting as far as cleaning perineal area with wet wipes before pulling up brief and standing by while returning to bed. Shoaib Del Rio states that she is able to assist with that at this time. Advised that [facility] needs to be empted after being used by patient. Son, Faheem, agrees to do this  at this time. Misbah Klein to contact visiting physician for further direction on insulin as she had a hypoglycemic episode last weekend. MSW discussed medicaid process with Faheem and Shoaib Del Rio. They are agreeable and willing to complete process and understand that this may take several weeks to receive aid. Discussed placement of patient in facility and both are not willing to do that at this time. Advised that hospice would be monitoring care of patient. Reviewed comfort pack with Faheem. He verbalizes understanding. No meds or supplies needed at this time.    Supplies ordered - chux pads, wipes, large gloves  No further needs at this time.

## 2021-10-26 NOTE — HOSPICE
Patient received laying in her hospital bed in the supine position with her foot touching the foot of the bed. Patient uncovered with her gown soiled with feces and feces all down her legs. Patient alert and her eyes open. Care provided per established plan of care:  Yes    Patient with or without complaints during care provided. Patient with complaint. Patient verbalized\"I am a mess\". \"I never thought that I would have become this helpless\". \"I don't have anyone to take care of me\". Family/caregiver requests N/A    Communicated to , Clinical Coordinator, or Director regarding patient/family/caregiver/aide findings:  Communicated with . Status of patient upon end of hospice aid visit:  Patient laying in her hospital bed in the supine position with her eyes open. Patient thanked me for the bath and patient said that she feels plenty better but she was cold and her hands was shaking. I went into the living room and I told patient daughter Shell Gagnon that patient was cold and I asked her if she had a blanket for her mother and Shell Gagnon got up and got me a blanket. I put the blanket on the patient and I took the trash out that I accumulated during my visit. I asked patient daughter where to put the trash and patient daughter told me that I could give it to her  and I handed the tied trash bag to Shell Gagnon. I went back into the room and I asked the patient if she was feeling warmer and patient said \"No I am still cold\". \" Could you ask my daughter to put the heat on for me\"? I went into the living room and I told patient daughter that patient is still feeling cold and she told me to ask you if you could put the heat on for her. Shell Gagnon replied with a fam \"NO\"  \"Franci cannot take the heat\"  Jose Hart was out in the garage at this time. Patient daughter gave me another blanket to put on the patient and told me to put it on the patient double because the blanket was light.  I put the blanket on the patient and patient daughter came into the patient bedroom and told patient to try this blanket. Patient daughter told patient that \"I have to leave the air condition on because Yohannes Guerrero don't like the heat\". Patient replied \"Well I hope I warm up because I am shivering. Patient was talkative and she asked me where she was and if this is [employer]. I told patient that she is at home and she is in [employer]. Patient daughter Lolly Lesch present in the room and patient said \"I did not know that I was back home\". Patient asked again if it was [employer]. I told patient yes this is [place]. Patient said \"And I am home\"  I told patient yes you are at home and I told her to look to her right and she will see her dolls and patient daughter and myself pointed out some of the patient collection items in her bedroom. Patient then smile and said \"I Am at home \". I asked patient if she was feeling warmer and patient said \"yes\". I then told patient that I will see her on Wednesday.

## 2021-10-27 ENCOUNTER — HOME CARE VISIT (OUTPATIENT)
Dept: SCHEDULING | Facility: HOME HEALTH | Age: 86
End: 2021-10-27
Payer: MEDICARE

## 2021-10-27 ENCOUNTER — HOME CARE VISIT (OUTPATIENT)
Dept: HOSPICE | Facility: HOSPICE | Age: 86
End: 2021-10-27
Payer: MEDICARE

## 2021-10-27 PROCEDURE — 0651 HSPC ROUTINE HOME CARE

## 2021-10-27 PROCEDURE — G0156 HHCP-SVS OF AIDE,EA 15 MIN: HCPCS

## 2021-10-28 ENCOUNTER — HOME CARE VISIT (OUTPATIENT)
Dept: SCHEDULING | Facility: HOME HEALTH | Age: 86
End: 2021-10-28
Payer: MEDICARE

## 2021-10-28 VITALS
SYSTOLIC BLOOD PRESSURE: 100 MMHG | DIASTOLIC BLOOD PRESSURE: 62 MMHG | OXYGEN SATURATION: 94 % | TEMPERATURE: 98.2 F | HEART RATE: 60 BPM | RESPIRATION RATE: 16 BRPM

## 2021-10-28 PROCEDURE — G0299 HHS/HOSPICE OF RN EA 15 MIN: HCPCS

## 2021-10-28 PROCEDURE — 0651 HSPC ROUTINE HOME CARE

## 2021-10-28 NOTE — HOSPICE
Patient received laying in her hospital bed in the supine position with her eyes open. Care provided per established plan of care:  Yes    Patient with or without complaints during care provided. Patient without complaint. Patient verbalized that she is tired and she could take a nap. Family/caregiver requests N/A    Communicated to , Clinical Coordinator, or Director regarding patient/family/caregiver/aide findingsN/A     Status of patient upon end of hospice aid visit:  Patient laying in her hospital bed with her eyes open.

## 2021-10-28 NOTE — HOSPICE
SN hospice visit to assess comfort and needs of patient. Patient is supine in bed at time of visit. She is alert and oriented only to self. States she is bored and wants to go to living room. Son states he is afraid that she will not be able to make it to living room and back safely. Noted to have eaten one poptart for breakfast and states she will eat the other on later. Denies pain or shortness of breath. Assessment wnl and skin remains in tact. Reminded patient and daughter that patient needs to be cleaned with wipes for front to back after toileting and toilet needs to be emptied promptly. Daugher states that this is being done. Educated on maintaining skin integrity and risk of bedsores and infections. Daughter states patient continues to have a good appetite and is eating 2 poptarts, sandwiches, chips and take out dinner daily. No falls or injuries reported. Son has not completed medicaid application. Reminded that this needs to be done as soon as possible in order to get more assistance for care of patient. He verbalizes understand but states he hasn't had time and will \"get to it when I get to it\". Reveiwed all meds with enmanuel and educated on new insulin orders per Jorge Gonzalez yesterday. He states he checks her blood sugar and decides what to give. Encouraged Holly to follow doctor's orders with Insulin dosage. He verbalizes understanding but states he'll continue to do what he is doing at this time. He is not agreeable with stopping blood sugar checks and insulin. Reviewed hospice services available, hospice philosophy, fall prevention, toileting, skin care, fire safety, 24 hour nurse call number. No medication or supply needs at this time.

## 2021-10-29 ENCOUNTER — HOME CARE VISIT (OUTPATIENT)
Dept: HOSPICE | Facility: HOSPICE | Age: 86
End: 2021-10-29
Payer: MEDICARE

## 2021-10-29 ENCOUNTER — HOME CARE VISIT (OUTPATIENT)
Dept: SCHEDULING | Facility: HOME HEALTH | Age: 86
End: 2021-10-29
Payer: MEDICARE

## 2021-10-29 PROCEDURE — 0651 HSPC ROUTINE HOME CARE

## 2021-10-29 PROCEDURE — G0156 HHCP-SVS OF AIDE,EA 15 MIN: HCPCS

## 2021-10-30 PROCEDURE — 0651 HSPC ROUTINE HOME CARE

## 2021-10-30 NOTE — HOSPICE
Patient received laying in her hospital bed in the supine position combing her hair. Care provided per established plan of care:  Yes. Patient with or without complaints during care provided. Patient is without complaint. Patient verbalized that she was trying to comb the \"tangles\" out of her hair. Family/caregiver requests N/A    Communicated to , Clinical Coordinator, or Director regarding patient/family/caregiver/aide findings N/A    Status of patient upon end of hospice aid visit:  Patient laying in her hospital bed with her eyes open. Patient said \"I think I am going to go to sleep\".

## 2021-10-31 PROCEDURE — 0651 HSPC ROUTINE HOME CARE

## 2021-11-01 ENCOUNTER — HOME CARE VISIT (OUTPATIENT)
Dept: HOSPICE | Facility: HOSPICE | Age: 86
End: 2021-11-01
Payer: MEDICARE

## 2021-11-01 PROCEDURE — G0299 HHS/HOSPICE OF RN EA 15 MIN: HCPCS

## 2021-11-01 PROCEDURE — 0651 HSPC ROUTINE HOME CARE

## 2021-11-01 NOTE — HOSPICE
Son, Morrow, refused disposal of hospice comfort izabela. Advised safe disposal and he states he will dispose of safely.